# Patient Record
Sex: MALE | Race: WHITE | NOT HISPANIC OR LATINO | Employment: FULL TIME | URBAN - METROPOLITAN AREA
[De-identification: names, ages, dates, MRNs, and addresses within clinical notes are randomized per-mention and may not be internally consistent; named-entity substitution may affect disease eponyms.]

---

## 2019-11-07 ENCOUNTER — APPOINTMENT (OUTPATIENT)
Dept: PREADMISSION TESTING | Facility: HOSPITAL | Age: 59
End: 2019-11-07

## 2019-11-07 LAB
ANION GAP SERPL CALCULATED.3IONS-SCNC: 9 MMOL/L (ref 5–15)
BUN BLD-MCNC: 11 MG/DL (ref 8–23)
BUN/CREAT SERPL: 13.3 (ref 7–25)
CALCIUM SPEC-SCNC: 9.3 MG/DL (ref 8.6–10.5)
CHLORIDE SERPL-SCNC: 101 MMOL/L (ref 98–107)
CO2 SERPL-SCNC: 30 MMOL/L (ref 22–29)
CREAT BLD-MCNC: 0.83 MG/DL (ref 0.76–1.27)
DEPRECATED RDW RBC AUTO: 43.4 FL (ref 37–54)
ERYTHROCYTE [DISTWIDTH] IN BLOOD BY AUTOMATED COUNT: 12.5 % (ref 12.3–15.4)
GFR SERPL CREATININE-BSD FRML MDRD: 95 ML/MIN/1.73
GLUCOSE BLD-MCNC: 89 MG/DL (ref 65–99)
HCT VFR BLD AUTO: 40.9 % (ref 37.5–51)
HGB BLD-MCNC: 13.3 G/DL (ref 13–17.7)
MCH RBC QN AUTO: 30.5 PG (ref 26.6–33)
MCHC RBC AUTO-ENTMCNC: 32.5 G/DL (ref 31.5–35.7)
MCV RBC AUTO: 93.8 FL (ref 79–97)
PLATELET # BLD AUTO: 234 10*3/MM3 (ref 140–450)
PMV BLD AUTO: 9.1 FL (ref 6–12)
POTASSIUM BLD-SCNC: 4.3 MMOL/L (ref 3.5–5.2)
RBC # BLD AUTO: 4.36 10*6/MM3 (ref 4.14–5.8)
SODIUM BLD-SCNC: 140 MMOL/L (ref 136–145)
WBC NRBC COR # BLD: 5.75 10*3/MM3 (ref 3.4–10.8)

## 2019-11-07 PROCEDURE — 93010 ELECTROCARDIOGRAM REPORT: CPT | Performed by: INTERNAL MEDICINE

## 2019-11-07 PROCEDURE — 93005 ELECTROCARDIOGRAM TRACING: CPT

## 2019-11-07 PROCEDURE — 36415 COLL VENOUS BLD VENIPUNCTURE: CPT

## 2019-11-07 PROCEDURE — 80048 BASIC METABOLIC PNL TOTAL CA: CPT | Performed by: SURGERY

## 2019-11-07 PROCEDURE — 85027 COMPLETE CBC AUTOMATED: CPT | Performed by: SURGERY

## 2022-01-12 ENCOUNTER — HOSPITAL ENCOUNTER (OUTPATIENT)
Dept: GENERAL RADIOLOGY | Facility: HOSPITAL | Age: 62
Discharge: HOME | End: 2022-01-12
Admitting: INTERNAL MEDICINE

## 2022-01-12 ENCOUNTER — TRANSCRIBE ORDERS (OUTPATIENT)
Dept: ADMINISTRATIVE | Facility: HOSPITAL | Age: 62
End: 2022-01-12

## 2022-01-12 DIAGNOSIS — M54.9 BACK PAIN, UNSPECIFIED BACK LOCATION, UNSPECIFIED BACK PAIN LATERALITY, UNSPECIFIED CHRONICITY: ICD-10-CM

## 2022-01-12 DIAGNOSIS — M54.9 BACK PAIN, UNSPECIFIED BACK LOCATION, UNSPECIFIED BACK PAIN LATERALITY, UNSPECIFIED CHRONICITY: Primary | ICD-10-CM

## 2022-01-12 PROCEDURE — 72110 X-RAY EXAM L-2 SPINE 4/>VWS: CPT

## 2022-01-13 ENCOUNTER — HOSPITAL ENCOUNTER (OUTPATIENT)
Dept: MRI IMAGING | Facility: HOSPITAL | Age: 62
Discharge: HOME | End: 2022-01-13
Admitting: INTERNAL MEDICINE

## 2022-01-13 ENCOUNTER — TRANSCRIBE ORDERS (OUTPATIENT)
Dept: ADMINISTRATIVE | Facility: HOSPITAL | Age: 62
End: 2022-01-13

## 2022-01-13 DIAGNOSIS — M51.06 LUMBAR DISC HERNIATION WITH MYELOPATHY: ICD-10-CM

## 2022-01-13 DIAGNOSIS — M51.06 LUMBAR DISC HERNIATION WITH MYELOPATHY: Primary | ICD-10-CM

## 2022-01-13 PROCEDURE — 72148 MRI LUMBAR SPINE W/O DYE: CPT

## 2022-01-22 ENCOUNTER — OFFICE VISIT (OUTPATIENT)
Dept: NEUROSURGERY | Facility: CLINIC | Age: 62
End: 2022-01-22

## 2022-01-22 VITALS — WEIGHT: 294.6 LBS | BODY MASS INDEX: 41.24 KG/M2 | HEIGHT: 71 IN | TEMPERATURE: 97.8 F

## 2022-01-22 DIAGNOSIS — M51.16 LUMBAR DISC HERNIATION WITH RADICULOPATHY: Primary | ICD-10-CM

## 2022-01-22 DIAGNOSIS — M51.36 DDD (DEGENERATIVE DISC DISEASE), LUMBAR: ICD-10-CM

## 2022-01-22 PROCEDURE — 99204 OFFICE O/P NEW MOD 45 MIN: CPT | Performed by: NEUROLOGICAL SURGERY

## 2022-01-22 RX ORDER — ATORVASTATIN CALCIUM 10 MG/1
10 TABLET, FILM COATED ORAL DAILY
COMMUNITY
End: 2023-02-09

## 2022-01-22 RX ORDER — CYCLOBENZAPRINE HCL 10 MG
10 TABLET ORAL
COMMUNITY
Start: 2022-01-18 | End: 2022-02-25

## 2022-01-22 RX ORDER — MELOXICAM 15 MG/1
15 TABLET ORAL DAILY
COMMUNITY

## 2022-01-22 RX ORDER — PREDNISONE 10 MG/1
TABLET ORAL SEE ADMIN INSTRUCTIONS
COMMUNITY
Start: 2022-01-19 | End: 2022-02-25

## 2022-01-22 RX ORDER — VERAPAMIL HYDROCHLORIDE 40 MG/1
TABLET ORAL
COMMUNITY
End: 2022-05-03 | Stop reason: DRUGHIGH

## 2022-01-22 RX ORDER — GABAPENTIN 300 MG/1
CAPSULE ORAL
Qty: 90 CAPSULE | Refills: 0 | Status: SHIPPED | OUTPATIENT
Start: 2022-01-22 | End: 2022-02-22

## 2022-01-22 RX ORDER — ASPIRIN 81 MG/1
81 TABLET ORAL DAILY
COMMUNITY

## 2022-01-22 RX ORDER — HYDROCODONE BITARTRATE AND ACETAMINOPHEN 5; 325 MG/1; MG/1
TABLET ORAL SEE ADMIN INSTRUCTIONS
COMMUNITY
Start: 2022-01-19 | End: 2022-02-25

## 2022-01-22 NOTE — PROGRESS NOTES
Patient: Pelon Johnson Jr.  : 1960    Primary Care Provider: Marsha Mas MD    Requesting Provider: As above        History    Chief Complaint: Right lower extremity pain and weakness.    History of Present Illness: Mr. Johnson is a 61-year-old  who in his 30s had a bout of back difficulty.  It took about 6 months to improve but has done well since then.  On 1/10/2022 he was bending over and developed a severe cramping in his right thigh.  He has had ongoing pain in the thigh as well as some numbness and weakness.  He has fallen on 4 occasions.  He is using a crutch just to provide additional support.  He has no low back pain.  He has no left lower extremity symptoms.  He has been on steroid medication and Norco.  He is worse with protracted sitting.    Review of Systems   Constitutional: Negative for activity change, appetite change, chills, diaphoresis, fatigue, fever and unexpected weight change.   HENT: Negative for congestion, dental problem, drooling, ear discharge, ear pain, facial swelling, hearing loss, mouth sores, nosebleeds, postnasal drip, rhinorrhea, sinus pressure, sinus pain, sneezing, sore throat, tinnitus, trouble swallowing and voice change.    Eyes: Negative for photophobia, pain, discharge, redness, itching and visual disturbance.   Respiratory: Negative for apnea, cough, choking, chest tightness, shortness of breath, wheezing and stridor.    Cardiovascular: Positive for palpitations. Negative for chest pain and leg swelling.   Gastrointestinal: Negative for abdominal distention, abdominal pain, anal bleeding, blood in stool, constipation, diarrhea, nausea, rectal pain and vomiting.   Endocrine: Negative for cold intolerance, heat intolerance, polydipsia, polyphagia and polyuria.   Genitourinary: Negative for decreased urine volume, difficulty urinating, dysuria, enuresis, flank pain, frequency, genital sores, hematuria, penile discharge, penile pain, penile  "swelling, scrotal swelling, testicular pain and urgency.   Musculoskeletal: Positive for back pain. Negative for arthralgias, gait problem, joint swelling, myalgias, neck pain and neck stiffness.   Skin: Negative for color change, pallor, rash and wound.   Allergic/Immunologic: Negative for environmental allergies, food allergies and immunocompromised state.   Neurological: Positive for tremors, weakness and numbness. Negative for dizziness, seizures, syncope, facial asymmetry, speech difficulty, light-headedness and headaches.   Hematological: Negative for adenopathy. Does not bruise/bleed easily.   Psychiatric/Behavioral: Negative for agitation, behavioral problems, confusion, decreased concentration, dysphoric mood, hallucinations, self-injury, sleep disturbance and suicidal ideas. The patient is not nervous/anxious and is not hyperactive.        The patient's past medical history, past surgical history, family history, and social history have been reviewed at length in the electronic medical record.    Physical Exam:   Temp 97.8 °F (36.6 °C)   Ht 180.3 cm (71\")   Wt 134 kg (294 lb 9.6 oz)   BMI 41.09 kg/m²   CONSTITUTIONAL: Patient is well-nourished, pleasant and appears stated age.  MUSCULOSKELETAL:  Straight leg raising is negative.  Cristiano's Sign is negative.  ROM in the low back is mildly limited in all directions.  Tenderness in the back to palpation is not observed.  NEUROLOGICAL:  Orientation, memory, attention span, language function, and cognition have been examined and are intact.  Strength is intact in the lower extremities to direct testing except for right leg extension which is 4/5.  Muscle tone is normal throughout.  Station and gait are normal.  Sensation is intact to light touch testing throughout.  Deep tendon reflexes are 1+ and symmetrical.  Coordination is intact.      Medical Decision Making    Data Review:   (All imaging studies were personally reviewed unless stated otherwise)  MRI of " the lumbar spine dated 1/13/2022 demonstrates some diffuse degenerative disc disease.  There is a central disc protrusion with a rightward inferiorly extruded fragment from L2-3.  The fragment descends to almost the L3-4 disc space    Diagnosis:   Right L3 radiculopathy secondary to disc protrusion.    Treatment Options:   I have referred the patient to physical therapy and have prescribed gabapentin.  He will follow-up in my clinic in about 3.5 weeks.  If he is not improved or certainly if his weakness progresses then we will need to consider surgical intervention.  He will finish up the steroid medication.  He will continue to use the hydrocodone as needed.       Diagnosis Plan   1. Lumbar disc herniation with radiculopathy     2. DDD (degenerative disc disease), lumbar         Scribed for Ho Baptiste MD by Silvia Contreras, [facility] 1/22/2022 14:27 EST      I, Dr. Baptiste, personally performed the services described in the documentation, as scribed in my presence, and it is both accurate and complete.

## 2022-01-27 ENCOUNTER — TREATMENT (OUTPATIENT)
Dept: PHYSICAL THERAPY | Facility: CLINIC | Age: 62
End: 2022-01-27

## 2022-01-27 DIAGNOSIS — M51.16 LUMBAR DISC HERNIATION WITH RADICULOPATHY: Primary | ICD-10-CM

## 2022-01-27 PROCEDURE — 97162 PT EVAL MOD COMPLEX 30 MIN: CPT | Performed by: PHYSICAL THERAPIST

## 2022-01-27 PROCEDURE — 97110 THERAPEUTIC EXERCISES: CPT | Performed by: PHYSICAL THERAPIST

## 2022-01-27 NOTE — PATIENT INSTRUCTIONS
Access Code: [email or web address]  URL: [email or web address]  Date: 01/27/2022  Prepared by: Corinne Perkins    Exercises  Hooklying Gluteal Sets - 1 x daily - 7 x weekly - 2 sets - 10 reps  Supine Quad Set - 1 x daily - 7 x weekly - 2 sets - 10 reps  Supine Lower Trunk Rotation - 1 x daily - 7 x weekly - 1 sets - 10 reps  Seated March - 1 x daily - 7 x weekly - 1-2 sets - 10 reps
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
## 2022-01-31 ENCOUNTER — TREATMENT (OUTPATIENT)
Dept: PHYSICAL THERAPY | Facility: CLINIC | Age: 62
End: 2022-01-31

## 2022-01-31 DIAGNOSIS — M51.16 LUMBAR DISC HERNIATION WITH RADICULOPATHY: Primary | ICD-10-CM

## 2022-01-31 PROCEDURE — 97110 THERAPEUTIC EXERCISES: CPT | Performed by: PHYSICAL THERAPIST

## 2022-01-31 PROCEDURE — 97112 NEUROMUSCULAR REEDUCATION: CPT | Performed by: PHYSICAL THERAPIST

## 2022-01-31 NOTE — PROGRESS NOTES
Physical Therapy Daily Treatment Note      Patient: Pelon Johnson Jr.   : 1960  Referring practitioner: Ho Baptiste MD  Date of Initial Visit: Type: THERAPY  Noted: 2022  Today's Date: 2022  Patient seen for 2 sessions       Visit Diagnoses:    ICD-10-CM ICD-9-CM   1. Lumbar disc herniation with radiculopathy  M51.16 722.10     724.4       Subjective   Patient reports his right leg feels more weak today on arrival, states it has given out on him 3x but he hasn't fallen due to using crutch in LUE. States he did have right leg cramps last night, states he has been off steroids and Norco since Saturday night, so he is only currently taking Gabapentin. States his pain level is 0/10 upon arrival.      Objective   Tandem LLE EO 26 seconds, tandem RLE EO 18 seconds, 8 seconds (initial attempt)    See Exercise, Manual, and Modality Logs for complete treatment.       Assessment/Plan   Pt demonstrates trendelenburg with gait on level surface, using one axillary crutch in LUE. In supine position, noted mild LLD with LLE longer than RLE. Added small, heel lift in R shoe. Mild improvement noted in gait. He tolerated progression of standing functional quad exercises well, noted fatigue but not mechanical buckling of knee. Pt educated on progressed HEP, given written copy. He denies radicular symptoms during exercise in clinic today.  Progress next visit to include TG squats, side stepping with tB for HEP. Assess response from heel lift.       Timed:         Manual Therapy:    0     mins  28666;     Therapeutic Exercise:    29     mins  27118;     Neuromuscular Marisol:    10    mins  82051;    Therapeutic Activity:     0     mins  43994;     Gait Trainin     mins  80264;     Ultrasound:     0     mins  87227;    Ionto                               0    mins   57600  Self Care                       0     mins   00436  Canalith Repos    0     mins 42407      Un-Timed:  Electrical Stimulation:    0      mins  58827 ( );  Dry Needling     0     mins self-pay  Traction     0     mins 52602      Timed Treatment:   39   mins   Total Treatment:     39   mins    Corinne E. Perkins, PT  [place] License: [id number]

## 2022-01-31 NOTE — PATIENT INSTRUCTIONS
Access Code: [email or web address]  URL: [email or web address]  Date: 01/31/2022  Prepared by: Corinne Perkins    Exercises  Supine Lower Trunk Rotation - 1 x daily - 7 x weekly - 1 sets - 10 reps  Supine Bridge - 1 x daily - 7 x weekly - 1-2 sets - 10 reps  Small Range Straight Leg Raise - 1 x daily - 7 x weekly - 1-2 sets - 10 reps  Seated March - 1 x daily - 7 x weekly - 1-2 sets - 10 reps  Standing Hip Abduction with Counter Support - 1 x daily - 7 x weekly - 1 sets - 10 reps

## 2022-02-02 ENCOUNTER — TREATMENT (OUTPATIENT)
Dept: PHYSICAL THERAPY | Facility: CLINIC | Age: 62
End: 2022-02-02

## 2022-02-02 DIAGNOSIS — M51.16 LUMBAR DISC HERNIATION WITH RADICULOPATHY: Primary | ICD-10-CM

## 2022-02-02 PROCEDURE — 97110 THERAPEUTIC EXERCISES: CPT | Performed by: PHYSICAL THERAPIST

## 2022-02-02 PROCEDURE — 97112 NEUROMUSCULAR REEDUCATION: CPT | Performed by: PHYSICAL THERAPIST

## 2022-02-02 NOTE — PATIENT INSTRUCTIONS
Access Code: [email or web address]: [email or web address]: 02/02/2022Prepared by: Corinne PerkinsExercises   Supine Lower Trunk Rotation - 1 x daily - 7 x weekly - 1 sets - 10 reps   Supine Bridge - 1 x daily - 7 x weekly - 1-2 sets - 10 reps   Seated March - 1 x daily - 7 x weekly - 1-2 sets - 10 reps   Standing Hip Abduction with Counter Support - 1 x daily - 7 x weekly - 1 sets - 10 reps   Supine Knee Extension Strengthening - 1 x daily - 7 x weekly - 1-2 sets - 10 reps

## 2022-02-02 NOTE — PROGRESS NOTES
Physical Therapy Daily Treatment Note      Patient: Pelon Johnson Jr.   : 1960  Referring practitioner: Ho Baptiste MD  Date of Initial Visit: Type: THERAPY  Noted: 2022  Today's Date: 2022  Patient seen for 3 sessions       Visit Diagnoses:    ICD-10-CM ICD-9-CM   1. Lumbar disc herniation with radiculopathy  M51.16 722.10     724.4       Subjective   Patient reports he had a little soreness after last visit. States his pain level is 0/10 upon arrival.      Objective   See Exercise, Manual, and Modality Logs for complete treatment.       Assessment/Plan   Pt demonstrates significant functional eccentric quad weakness RLE with step downs from 1 stepper height. Pt is compliant with current HEP, modified HEP to include SAQ vs SLR for improved technique.       Timed:         Manual Therapy:    0     mins  55128;     Therapeutic Exercise:    29     mins  94526;     Neuromuscular Marisol:    10    mins  01647;    Therapeutic Activity:     0     mins  82803;     Gait Trainin     mins  36248;     Ultrasound:     0     mins  99103;    Ionto                               0    mins   33596  Self Care                       0     mins   63253  Canalith Repos    0     mins 10491      Un-Timed:  Electrical Stimulation:    0     mins  08866 ( );  Dry Needling     0     mins self-pay  Traction     0     mins 36662      Timed Treatment:   39   mins   Total Treatment:     39   mins    Corinne E. Perkins, PT  [place] License: [id number]

## 2022-02-07 ENCOUNTER — TREATMENT (OUTPATIENT)
Dept: PHYSICAL THERAPY | Facility: CLINIC | Age: 62
End: 2022-02-07

## 2022-02-07 DIAGNOSIS — M51.16 LUMBAR DISC HERNIATION WITH RADICULOPATHY: Primary | ICD-10-CM

## 2022-02-07 PROCEDURE — 97112 NEUROMUSCULAR REEDUCATION: CPT | Performed by: PHYSICAL THERAPIST

## 2022-02-07 PROCEDURE — 97110 THERAPEUTIC EXERCISES: CPT | Performed by: PHYSICAL THERAPIST

## 2022-02-07 NOTE — PROGRESS NOTES
Physical Therapy Daily Treatment Note      Patient: Pelon Johnson Jr.   : 1960  Referring practitioner: Ho Baptiste MD  Date of Initial Visit: Type: THERAPY  Noted: 2022  Today's Date: 2022  Patient seen for 4 sessions       Visit Diagnoses:    ICD-10-CM ICD-9-CM   1. Lumbar disc herniation with radiculopathy  M51.16 722.10     724.4       Subjective   Patient reports he has been walking more without his crutch.  States his pain level is 0/10 upon arrival.  States one morning he took a 5 mg steroid due to some pain, states he woke up with pain. No crutch with walking at home. States he has had some impaired balance moments, but denies any falls or close calls with right knee giving way.     Objective   Significant functional weakness of right hip flexors; fatigues quickly with standing exercise for hip flexion, SLS    See Exercise, Manual, and Modality Logs for complete treatment.       Assessment/Plan   Pt able to ambulate safely on level surface without AD today; noted increased right hip ER and right foot toeing out. He demonstrates functional weakness of right hip flexors along with right quad, fatigues quickly but tolerated increased reps of quad specific exercises today. Continue progression of right leg strengthening, emphasis on hip flexion, quads, hip abduction, and extension. Progress bridge HEP next visit along with SLS activity as tolerated.       Timed:         Manual Therapy:    0     mins  32683;     Therapeutic Exercise:    25     mins  60181;     Neuromuscular Marisol:    10    mins  33684;    Therapeutic Activity:     6     mins  18350;     Gait Trainin     mins  50423;     Ultrasound:     0     mins  02449;    Ionto                               0    mins   86097  Self Care                       0     mins   99083  Canalith Repos    0     mins 07827      Un-Timed:  Electrical Stimulation:    0     mins  38097 ( );  Dry Needling     0     mins  self-pay  Traction     0     mins 86177      Timed Treatment:   41   mins   Total Treatment:     45   mins    Corinne E. Perkins, PT  [place] License: [id number]

## 2022-02-09 ENCOUNTER — TREATMENT (OUTPATIENT)
Dept: PHYSICAL THERAPY | Facility: CLINIC | Age: 62
End: 2022-02-09

## 2022-02-09 DIAGNOSIS — M51.16 LUMBAR DISC HERNIATION WITH RADICULOPATHY: Primary | ICD-10-CM

## 2022-02-09 PROCEDURE — 97112 NEUROMUSCULAR REEDUCATION: CPT | Performed by: PHYSICAL THERAPIST

## 2022-02-09 PROCEDURE — 97110 THERAPEUTIC EXERCISES: CPT | Performed by: PHYSICAL THERAPIST

## 2022-02-09 NOTE — PATIENT INSTRUCTIONS
Access Code: [email or web address]  URL: [email or web address]  Date: 02/09/2022  Prepared by: Corinne Perkins    Exercises  Supine Diaphragmatic Breathing - 1 x daily - 7 x weekly - 3 sets - 10 reps

## 2022-02-09 NOTE — PROGRESS NOTES
Physical Therapy Daily Treatment Note      Patient: Pelon Johnson Jr.   : 1960  Referring practitioner: Ho Baptiste MD  Date of Initial Visit: Type: THERAPY  Noted: 2022  Today's Date: 2022  Patient seen for 5 sessions       Visit Diagnoses:    ICD-10-CM ICD-9-CM   1. Lumbar disc herniation with radiculopathy  M51.16 722.10     724.4       Subjective   States his pain level is 0/10 upon arrival.  States mild pain, 2/10 right lateral hip, intermittently throughout today. States he has noticed mild increase in left knee pain today with Nustep and attempted step ups.     Objective   See Exercise, Manual, and Modality Logs for complete treatment.       Assessment/Plan   Pt is making steady progress with improved right quad strength, tolerated SLR with a mild right quad ext lag noted after 4 reps. Educated patient on diaphragmatic breathing to improve trA activation and spinal stabilization. Pt able to incorporate alternating BLE march from hook lying position. Limited standing therex secondary to reports of increased left medial knee pain and intermittent right lateral hip pain today. He is ambulating well without AD, step through gait pattern, equal step lengths.       Timed:         Manual Therapy:    0     mins  35033;     Therapeutic Exercise:    29     mins  31845;     Neuromuscular Marisol:    10    mins  21638;    Therapeutic Activity:     0     mins  09623;     Gait Trainin     mins  98441;     Ultrasound:     0     mins  37478;    Ionto                               0    mins   62159  Self Care                       0     mins   13266  Canalith Repos    0     mins 82395      Un-Timed:  Electrical Stimulation:    0     mins  09719 ( );  Dry Needling     0     mins self-pay  Traction     0     mins 94356      Timed Treatment:   39   mins   Total Treatment:     39   mins    Corinne E. Perkins, PT  [place] License: [id number]

## 2022-02-21 ENCOUNTER — TREATMENT (OUTPATIENT)
Dept: PHYSICAL THERAPY | Facility: CLINIC | Age: 62
End: 2022-02-21

## 2022-02-21 DIAGNOSIS — M51.16 LUMBAR DISC HERNIATION WITH RADICULOPATHY: Primary | ICD-10-CM

## 2022-02-21 PROCEDURE — 97110 THERAPEUTIC EXERCISES: CPT | Performed by: PHYSICAL THERAPIST

## 2022-02-21 PROCEDURE — 97112 NEUROMUSCULAR REEDUCATION: CPT | Performed by: PHYSICAL THERAPIST

## 2022-02-21 NOTE — PROGRESS NOTES
Physical Therapy Daily Treatment Note      Patient: Pelon Johnson Jr.   : 1960  Referring practitioner: Ho Baptiste MD  Date of Initial Visit: Type: THERAPY  Noted: 2022  Today's Date: 2022  Patient seen for 6 sessions       Visit Diagnoses:    ICD-10-CM ICD-9-CM   1. Lumbar disc herniation with radiculopathy  M51.16 722.10     724.4       Subjective   Patient reports increased left knee pain recently. States  his pain level is 1-2/10 upon arrival Patient reports his back has been doing well, less symptoms in RLE overall; however did have recent episode of R knee giving out when attempted to climb stair at [employer] [facility] with RLE first.     Objective   See Exercise, Manual, and Modality Logs for complete treatment.       Assessment/Plan   Pt demonstrates improved RLE quad activation, able to perform 5-6 reps of SAQ into SLR prior to extension lag and fatigue. He is making steady progress with strengthening exercises. Pt is scheduled to see MD this Friday. Reassessment next visit.       Timed:         Manual Therapy:    0     mins  10408;     Therapeutic Exercise:    28     mins  09253;     Neuromuscular Marisol:    10    mins  40382;    Therapeutic Activity:     0     mins  47860;     Gait Trainin     mins  99375;     Ultrasound:     0     mins  57674;    Ionto                               0    mins   65204  Self Care                       0     mins   23976  Canalith Repos    0     mins 13083      Un-Timed:  Electrical Stimulation:    0     mins  50019 ( );  Dry Needling     0     mins self-pay  Traction     0     mins 91892      Timed Treatment:   38   mins   Total Treatment:     43   mins    Corinne E. Perkins, PT  [place] License: [id number]

## 2022-02-22 DIAGNOSIS — M51.16 LUMBAR DISC HERNIATION WITH RADICULOPATHY: ICD-10-CM

## 2022-02-22 RX ORDER — GABAPENTIN 300 MG/1
CAPSULE ORAL
Qty: 90 CAPSULE | Refills: 1 | Status: SHIPPED | OUTPATIENT
Start: 2022-02-22 | End: 2022-05-16

## 2022-02-22 NOTE — TELEPHONE ENCOUNTER
Provider:  Emile  Caller: patient  Time of call:     Phone #: [phone]   Surgery:    Surgery Date:    Last visit:   01/22/22  Next visit: 02/25/22    TONY:    01/12/2022 Hydrocodone Bitartrate/Ac 325MG/5MG 1960 18 3 Marsha Mas [employer] [place] [place] 30 1  01/19/2022 Hydrocodone Bitartrate/Ac 325MG/5MG 1960 18 3 [facility] [place] [facility]  01/22/2022 Gabapentin 300MG 1960 90 33 Ho Baptiste [place] [facility] [place] [facility]     Reason for call:    Patient requests refill on Gabapentin.  He has tapered up to 1 po tid.

## 2022-02-22 NOTE — PROGRESS NOTES
Physical Therapy Daily Treatment Note  Opened on error; no charge.     Corinne E. Perkins, PT  [place] License: [id number]

## 2022-02-23 ENCOUNTER — TREATMENT (OUTPATIENT)
Dept: PHYSICAL THERAPY | Facility: CLINIC | Age: 62
End: 2022-02-23

## 2022-02-23 DIAGNOSIS — M51.16 LUMBAR DISC HERNIATION WITH RADICULOPATHY: Primary | ICD-10-CM

## 2022-02-23 PROCEDURE — 97530 THERAPEUTIC ACTIVITIES: CPT | Performed by: PHYSICAL THERAPIST

## 2022-02-23 PROCEDURE — 97110 THERAPEUTIC EXERCISES: CPT | Performed by: PHYSICAL THERAPIST

## 2022-02-23 NOTE — PROGRESS NOTES
Physical Therapy Re Certification Of Plan of Care  Patient: Pelon Johnson Jr.   : 1960  Diagnosis/ICD-10 Code:  Lumbar disc herniation with radiculopathy [M51.16]  Referring practitioner: Ho Baptiste MD  Date of Initial Visit: 2022  Today's Date: 2022  Patient seen for 7 sessions         Visit Diagnoses:    ICD-10-CM ICD-9-CM   1. Lumbar disc herniation with radiculopathy  M51.16 722.10     724.4           Subjective Questionnaire: Oswestry: 24%  Clinical Progress: improved  Home Program Compliance: Yes  Treatment has included: therapeutic exercise, neuromuscular re-education, manual therapy and therapeutic activity      Subjective   Pelon Johnson reports: he was fatigued and sore from last visit. States he was surprised because he felt good after the visit. He denies radicular symptoms in RLE, reports he hasn't noticed numbness in right thigh for a little over a week. Reports he does get intermittent cramping in right leg while sleeping sometimes. He has f/u with Dr. Baptiste this week.     Objective        Special Questions  Patient is experiencing disturbed sleep.     Additional Special Questions  Pt denies pain with bowel/bladder; reports sleep has improved 90-95% since initial evaluation. A few mornings waking up due to mild pain, took medication with resolution.       Postural Observations  Seated posture: fair        Palpation     Additional Palpation Details  Pt denies TTP along lumbar spine.     Neurological Testing     Sensation     Lumbar   Left   Intact: light touch and hot/cold discrimination    Right   Intact: light touch and hot/cold discrimination    Reflexes   Left   Patellar (L4): trace (1+)  Achilles (S1): trace (1+)    Right   Patellar (L4): trace (1+)  Achilles (S1): trace (1+)    Additional Neurological Details  Reports mild hypersensitivity to right quad when he leaned forward into a counter/table.     Active Range of Motion     Lumbar   Flexion: 69 degrees    Extension: 29 degrees   Left lateral flexion: 29 degrees   Right lateral flexion: 26 degrees     Strength/Myotome Testing     Left Hip   Planes of Motion   Flexion: 5  Extension: 4+  Abduction: 5  Adduction: 5    Right Hip   Planes of Motion   Flexion: 4  Extension: 4  Abduction: 4+  Adduction: 5    Left Knee   Flexion: 5  Extension: 5    Right Knee   Flexion: 5  Extension: 4    Left Ankle/Foot   Dorsiflexion: 5  Plantar flexion: 5    Right Ankle/Foot   Dorsiflexion: 4+  Plantar flexion: 5    Tests     Lumbar   Positive repeated flexion.   Negative repeated extension.     Right   Negative crossed SLR, passive lumbar instability, passive SLR and valsalva.     Right Pelvic Girdle/Sacrum   Negative: sacral spring.     Additional Tests Details   Prone prop up WFL, no radicular symptoms.   (-) CHANCE bilateral  Mild taut right hamstring  Taut right quads with prone quad stretch    Able to perform coordination tests: toe taps WFL, heel to shin L WFL, decreased on RLE but able to perform    Ambulation     Ambulation: Stairs   Ascend stairs: independent  Pattern: non-reciprocal  Railings: two rails  Descend stairs: independent  Pattern: non-reciprocal  Railings: two rails    Additional Stairs Ambulation Details  Attempted reciprocal gait up stairs for 5 steps, WFL with 2 handrails    With descending stairs, noted 1 buckling episode of right knee when attempted reciprocal gait.   Encouraged step through ascending with BUE support, and step to gait descending, leading with RLE.     Comments   Pt ambulated with step through gait pattern, no AD, equal step lengths.               Assessment & Plan     Assessment  Impairments: abnormal gait, abnormal or restricted ROM, activity intolerance, impaired balance, impaired physical strength and pain with function  Functional Limitations: carrying objects, lifting, sleeping, walking, uncomfortable because of pain and sitting  Assessment details: Reassessment completed today in clinic  for 62 yo male referred to PT for lumbar radiculopathy affecting RLE who demonstrates signs and symptoms consistent with diagnosis. His recent lumbar MRI revealed a central disc protrusion with a rightward inferiorly extruded fragment from L2-L3. He is progressing towards his functional goals as expected, centralized RLE symptoms, demonstrates improved gait mechanics on level ground without AD and denies LBP. Improved R quad activation noted with SLR, fatigues with about 5 reps with extension lag. He denies any falls in past 4 weeks; however has had a few episodes of right knee buckling with stairs. Recommend continued skilled PT to further improve RLE strength, spinal stabilization, and increase activity tolerance with walking/functional mobility.   Prognosis: good    Goals  Plan Goals: Short Term Goals (2 weeks)  1. Patient is independent with HEP for flexibility and strengthening. MET  2.  Patient to report pain less than or equal to 2/10. MET  3.  Patient to report decreased pain with bending forward to ADLs or prolonged sitting at work for 1-2 hours. MET  4. Patient subjectively report that altered leg symptoms have decreased by 50% with frequency or intensity. MET    Long Term Goals (4 weeks)  1. Patient will demonstrate lumbar AROM WNL in all planes to improve ability to perform daily functional activities. ONGOING  2. Patient is independent with long term HEP for improved postural awareness and stabilization. ONGOING  3. Modified Oswestry score is improved by at least 10%. MET  4.Patient will demonstrate proper lifting mechanics, utilizing abdominal stabilization. ONGOING  5. Pt will ambulate 450 ft without AD independently w/o right knee buckling. MET      Plan  Therapy options: will be seen for skilled therapy services  Planned modality interventions: cryotherapy, dry needling, electrical stimulation/Russian stimulation, traction and thermotherapy (hydrocollator packs)  Planned therapy interventions:  abdominal trunk stabilization, balance/weight-bearing training, body mechanics training, flexibility, functional ROM exercises, gait training, home exercise program, joint mobilization, manual therapy, neuromuscular re-education, postural training, soft tissue mobilization, spinal/joint mobilization, strengthening, stretching and therapeutic activities  Frequency: 2x week  Duration in visits: 8  Duration in weeks: 4  Treatment plan discussed with: patient  Plan details: F/u with patient after MD appt. Progress spinal stabilization exercises and HEP as tolerated. Resume walking treadmill, discussed option for home.            Recommendations: Continue as planned  Timeframe: 1 month  Prognosis to achieve goals: good      Timed:         Manual Therapy:    0     mins  61164;     Therapeutic Exercise:    29     mins  05898;     Neuromuscular Marisol:    0    mins  48597;    Therapeutic Activity:     10     mins  51172;     Gait Trainin     mins  74668;     Ultrasound:     0     mins  30578;    Ionto                               0    mins   52869  Self Care                       0     mins   41406  Canalith Repos    0     mins 68096      Un-Timed:  Electrical Stimulation:    0     mins  32337 (MC );  Dry Needling     0     mins self-pay  Traction     0     mins 83158  Re-Eval                           0    mins  15769      Timed Treatment:   39   mins   Total Treatment:     43   mins          PT: Corinne E. Perkins, PT     [place] License:  [id number]    Electronically signed by Corinne E. Perkins, PT, 22, 3:09 PM EST    Certification Period: 2022 thru 2022  I certify that the therapy services are furnished while this patient is under my care.  The services outlined above are required by this patient, and will be reviewed every 90 days.         Physician Signature:__________________________________________________    PHYSICIAN: Ho Baptiste MD   NPI: [id number]     DATE:     Please sign and return  via fax to [email or web address] . Thank you, [facility]

## 2022-02-25 ENCOUNTER — OFFICE VISIT (OUTPATIENT)
Dept: NEUROSURGERY | Facility: CLINIC | Age: 62
End: 2022-02-25

## 2022-02-25 VITALS — TEMPERATURE: 97.1 F | HEIGHT: 71 IN | WEIGHT: 304 LBS | BODY MASS INDEX: 42.56 KG/M2

## 2022-02-25 DIAGNOSIS — M51.36 DDD (DEGENERATIVE DISC DISEASE), LUMBAR: ICD-10-CM

## 2022-02-25 DIAGNOSIS — M51.16 LUMBAR DISC HERNIATION WITH RADICULOPATHY: Primary | ICD-10-CM

## 2022-02-25 PROCEDURE — 99213 OFFICE O/P EST LOW 20 MIN: CPT | Performed by: NEUROLOGICAL SURGERY

## 2022-02-25 NOTE — PROGRESS NOTES
Patient: Pelon Johnson Jr.  : 1960    Primary Care Provider: Marsha Mas MD    Requesting Provider: As above        History    Chief Complaint: Right lower extremity pain and weakness.    History of Present Illness: Mr. Johnson is a 61-year-old  who in his 30s had a bout of back difficulty.  It took about 6 months to improve but has done well since then.  On 1/10/2022 he was bending over and developed a severe cramping in his right thigh.  He had experienced right thigh pain as well as numbness and weakness.  He had fallen and was using a crutch.  Studies demonstrated L2-3 disc herniation on the right that descended to nearly the L3-4 disc space.  He has done therapy and was placed on gabapentin.  He is much better.  He is off his crutch.  His pain remains but at a much lower intensity.  The gabapentin is well-tolerated and is quite helpful.  He still has a little bit of trouble climbing stairs and walking up inclines.    Review of Systems   Constitutional: Negative for activity change, appetite change, chills, diaphoresis, fatigue, fever and unexpected weight change.   HENT: Negative for congestion, dental problem, drooling, ear discharge, ear pain, facial swelling, hearing loss, mouth sores, nosebleeds, postnasal drip, rhinorrhea, sinus pressure, sneezing, sore throat, tinnitus, trouble swallowing and voice change.    Eyes: Negative for photophobia, pain, discharge, redness, itching and visual disturbance.   Respiratory: Negative for apnea, cough, choking, chest tightness, shortness of breath, wheezing and stridor.    Cardiovascular: Negative for chest pain, palpitations and leg swelling.   Gastrointestinal: Negative for abdominal distention, abdominal pain, anal bleeding, blood in stool, constipation, diarrhea, nausea, rectal pain and vomiting.   Endocrine: Negative for cold intolerance, heat intolerance, polydipsia, polyphagia and polyuria.   Genitourinary: Negative for decreased  "urine volume, difficulty urinating, dysuria, enuresis, flank pain, frequency, genital sores, hematuria and urgency.   Musculoskeletal: Positive for arthralgias, back pain and gait problem. Negative for joint swelling, myalgias, neck pain and neck stiffness.   Skin: Negative for color change, pallor, rash and wound.   Allergic/Immunologic: Negative for environmental allergies, food allergies and immunocompromised state.   Neurological: Positive for weakness. Negative for dizziness, tremors, seizures, syncope, facial asymmetry, speech difficulty, light-headedness, numbness and headaches.   Hematological: Negative for adenopathy. Does not bruise/bleed easily.   Psychiatric/Behavioral: Negative for agitation, behavioral problems, confusion, decreased concentration, dysphoric mood, hallucinations, self-injury, sleep disturbance and suicidal ideas. The patient is not nervous/anxious and is not hyperactive.    All other systems reviewed and are negative.      The patient's past medical history, past surgical history, family history, and social history have been reviewed at length in the electronic medical record.    Physical Exam:   Temp 97.1 °F (36.2 °C) (Infrared)   Ht 180.3 cm (71\")   Wt (!) 138 kg (304 lb)   BMI 42.40 kg/m²   Patient ambulates independently.  Right leg extension is 4/5.    Medical Decision Making    Data Review:   (All imaging studies were personally reviewed unless stated otherwise)  MRI of the lumbar spine dated 1/13/2022 demonstrates some diffuse degenerative disc disease.  There is a central disc protrusion with a rightward inferiorly extruded fragment from L2-3.  The fragment descends to almost the L3-4 disc space.    Diagnosis:   Right L3 radiculopathy secondary to disc protrusion.    Treatment Options:   The patient is doing much better.  He will continue to be active.  He will continue his gabapentin.  He will follow-up in our clinic in 2.5-3 months.  At that time if he is doing well then we " will slowly wean the gabapentin.  If his symptoms worsen in the interim then he will contact me.       Diagnosis Plan   1. Lumbar disc herniation with radiculopathy     2. DDD (degenerative disc disease), lumbar         Scribed for Ho Baptiste MD by Silvia Contreras, [facility] 2/25/2022 16:16 EST      I, Dr. Baptiste, personally performed the services described in the documentation, as scribed in my presence, and it is both accurate and complete.

## 2022-03-02 ENCOUNTER — TREATMENT (OUTPATIENT)
Dept: PHYSICAL THERAPY | Facility: CLINIC | Age: 62
End: 2022-03-02

## 2022-03-02 DIAGNOSIS — M51.16 LUMBAR DISC HERNIATION WITH RADICULOPATHY: Primary | ICD-10-CM

## 2022-03-02 PROCEDURE — 97530 THERAPEUTIC ACTIVITIES: CPT | Performed by: PHYSICAL THERAPIST

## 2022-03-02 PROCEDURE — 97110 THERAPEUTIC EXERCISES: CPT | Performed by: PHYSICAL THERAPIST

## 2022-03-02 NOTE — PROGRESS NOTES
Physical Therapy Daily Treatment Note      Patient: Pelon Johnson Jr.   : 1960  Referring practitioner: Ho Baptiste MD  Date of Initial Visit: Type: THERAPY  Noted: 2022  Today's Date: 3/2/2022  Patient seen for 8 sessions       Visit Diagnoses:    ICD-10-CM ICD-9-CM   1. Lumbar disc herniation with radiculopathy  M51.16 722.10     724.4       Subjective   Patient reports he didn't sleep well last night, reports mild pain in right lateral hip, rated 1/10 today on arrival. States prior to today, back and leg symptoms have improved. Pt states he doesn't feel pain with driving, standing or prolonged sitting. States his f/u with Dr. Baptiste went well, continuing the Gabapentin for now and anticipates weaning off as able in the future.     Objective   See Exercise, Manual, and Modality Logs for complete treatment.       Assessment/Plan   Held progression of spinal stabilization exercises in clinic today secondary to reports of increased right hip pain, unsure if related to primary LBP. No significant change in hip pain noted with prone, sitting, or standing positions. Denies TTP along GT, ITB, and lateral hip with palpation. Reviewed current HEP and modifications for home as needed. Encouraged patient to begin walking program, short distances as tolerated.       Timed:         Manual Therapy:    0     mins  16702;     Therapeutic Exercise:    23     mins  28792;     Neuromuscular Marisol:    0    mins  81319;    Therapeutic Activity:     8     mins  25735;     Gait Trainin     mins  99891;     Ultrasound:     0     mins  02901;    Ionto                               0    mins   77537  Self Care                       0     mins   84059  Canalith Repos    0     mins 52991      Un-Timed:  Electrical Stimulation:    0     mins  36916 ( );  Dry Needling     0     mins self-pay  Traction     0     mins 25019      Timed Treatment:   31   mins   Total Treatment:     31   mins    Corinne E.  Rosemary, PT  [place] License: [id number]

## 2022-03-09 ENCOUNTER — TREATMENT (OUTPATIENT)
Dept: PHYSICAL THERAPY | Facility: CLINIC | Age: 62
End: 2022-03-09

## 2022-03-09 DIAGNOSIS — M51.16 LUMBAR DISC HERNIATION WITH RADICULOPATHY: Primary | ICD-10-CM

## 2022-03-09 PROCEDURE — 97110 THERAPEUTIC EXERCISES: CPT | Performed by: PHYSICAL THERAPIST

## 2022-03-09 PROCEDURE — 97112 NEUROMUSCULAR REEDUCATION: CPT | Performed by: PHYSICAL THERAPIST

## 2022-03-09 NOTE — PROGRESS NOTES
Physical Therapy Daily Treatment Note      Patient: Pelon Johnson Jr.   : 1960  Referring practitioner: Ho Baptiste MD  Date of Initial Visit: Type: THERAPY  Noted: 2022  Today's Date: 3/9/2022  Patient seen for 9 sessions       Visit Diagnoses:    ICD-10-CM ICD-9-CM   1. Lumbar disc herniation with radiculopathy  M51.16 722.10     724.4       Subjective   Patient reports he is still on gabapentin, states his early morning pain is getting more rare. States he is using arthritis cream about every other day on his left knee. States  his pain level is 0/10 upon arrival.      Objective   See Exercise, Manual, and Modality Logs for complete treatment.       Assessment/Plan   Patient demonstrates trendelenburg gait pattern with slower and faster gait speeds, mild decreased arm swing on R. Attempted right hip flexor stretching RLE and LLE hamstring stretch for improved gait mechanics as well. He tolerated progression of eccentric quad strengthening exercises well, fatigued but denies pain. Assess response from exercise, progress as tolerated for improved hip/pelvic stabilization and knee strength.       Timed:         Manual Therapy:    0     mins  35925;     Therapeutic Exercise:    23     mins  71492;     Neuromuscular Marisol:    15    mins  07657;    Therapeutic Activity:     0     mins  99697;     Gait Trainin     mins  19275;     Ultrasound:     0     mins  20771;    Ionto                               0    mins   12332  Self Care                       0     mins   40005  Canalith Repos    0     mins 85631      Un-Timed:  Electrical Stimulation:    0     mins  99677 (MC );  Dry Needling     0     mins self-pay  Traction     0     mins 91024      Timed Treatment:   38   mins   Total Treatment:     40   mins    Corinne E. Perkins, PT  [place] License: [id number]

## 2022-03-16 ENCOUNTER — TREATMENT (OUTPATIENT)
Dept: PHYSICAL THERAPY | Facility: CLINIC | Age: 62
End: 2022-03-16

## 2022-03-16 DIAGNOSIS — M51.16 LUMBAR DISC HERNIATION WITH RADICULOPATHY: Primary | ICD-10-CM

## 2022-03-16 PROCEDURE — 97112 NEUROMUSCULAR REEDUCATION: CPT | Performed by: PHYSICAL THERAPIST

## 2022-03-16 PROCEDURE — 97110 THERAPEUTIC EXERCISES: CPT | Performed by: PHYSICAL THERAPIST

## 2022-03-16 NOTE — PROGRESS NOTES
Physical Therapy Daily Treatment Note      Patient: Pelon Johnson Jr.   : 1960  Referring practitioner: Ho Baptiste MD  Date of Initial Visit: Type: THERAPY  Noted: 2022  Today's Date: 3/16/2022  Patient seen for 10 sessions       Visit Diagnoses:    ICD-10-CM ICD-9-CM   1. Lumbar disc herniation with radiculopathy  M51.16 722.10     724.4       Subjective   Patient reports his back has been doing well; however states he had his first instance of right knee giving out this morning, first time its happened in a few weeks. States he was able to regain balance with UEs, no fall. States  his pain level is 0/10 upon arrival. States he is putting arthritis cream on left knee daily due to noted increased left knee pain.     Objective   See Exercise, Manual, and Modality Logs for complete treatment.       Assessment/Plan   Held progression of standing NMRE and hip strengthening in standing position today secondary to reports of increased left knee pain. He is weight shifting towards R in lateral direction and forward direction well, improved functional quad activation noted. Could consider ortho referral for left knee pain, possible injection, if joint pain persists and limits overall activity/participation in therapy. He would benefit from continued hip strengthening exercises, knee exercises, and NMRE for improved balance and decrease fall risks.       5  Timed:         Manual Therapy:    4     mins  12329;     Therapeutic Exercise:    23     mins  52617;     Neuromuscular Marisol:    10    mins  60709;    Therapeutic Activity:     0     mins  38762;     Gait Trainin     mins  66878;     Ultrasound:     0     mins  22113;    Ionto                               0    mins   58499  Self Care                       0     mins   09085  Canalith Repos    0     mins 62765      Un-Timed:  Electrical Stimulation:    0     mins  89179 ( );  Dry Needling     0     mins self-pay  Traction     0      mins 91978      Timed Treatment:   37   mins   Total Treatment:     40   mins    Corinne E. Perkins, PT  [place] License: [id number]

## 2022-03-23 ENCOUNTER — TREATMENT (OUTPATIENT)
Dept: PHYSICAL THERAPY | Facility: CLINIC | Age: 62
End: 2022-03-23

## 2022-03-23 DIAGNOSIS — M51.16 LUMBAR DISC HERNIATION WITH RADICULOPATHY: Primary | ICD-10-CM

## 2022-03-23 PROCEDURE — 97112 NEUROMUSCULAR REEDUCATION: CPT | Performed by: PHYSICAL THERAPIST

## 2022-03-23 PROCEDURE — 97110 THERAPEUTIC EXERCISES: CPT | Performed by: PHYSICAL THERAPIST

## 2022-03-23 NOTE — PROGRESS NOTES
Physical Therapy Daily Treatment Note      Patient: Pelon Johnson Jr.   : 1960  Referring practitioner: Ho Baptiste MD  Date of Initial Visit: Type: THERAPY  Noted: 2022  Today's Date: 3/24/2022  Patient seen for 11 sessions       Visit Diagnoses:    ICD-10-CM ICD-9-CM   1. Lumbar disc herniation with radiculopathy  M51.16 722.10     724.4       Subjective   Patient reports he was able to walk on treadmill this week, about 10 min at 2.0mph. States he felt good. States his pain level is 0/10 upon arrival. Pt states he had fall when attempting to step down off a curb, leading with RLE. States he fell to the ground.     Objective   See Exercise, Manual, and Modality Logs for complete treatment.       Assessment/Plan  Patient limited with standing hip exercise progression secondary to increased left knee pain. He verbalized compliance with current HEP, states his goal is to return to walking on treadmill for improved health/wellness. Able to walk on treadmill for 5 minutes on 2.0mph prior to noted increase L knee pain. Encouraged patient to consider ortho referral for left knee pain. Reassessment next visit.     Timed:         Manual Therapy:    0     mins  96373;     Therapeutic Exercise:    30     mins  11635;     Neuromuscular Marisol:    8    mins  91734;    Therapeutic Activity:     0     mins  02407;     Gait Trainin     mins  70408;     Ultrasound:     0     mins  40777;    Ionto                               0    mins   89217  Self Care                       0     mins   08148  Canalith Repos    0     mins 94820      Un-Timed:  Electrical Stimulation:    0     mins  15395 ( );  Dry Needling     0     mins self-pay  Traction     0     mins 42777      Timed Treatment:   38   mins   Total Treatment:     38   mins    Corinne E. Perkins, PT  [place] License: [id number]

## 2022-03-30 ENCOUNTER — TREATMENT (OUTPATIENT)
Dept: PHYSICAL THERAPY | Facility: CLINIC | Age: 62
End: 2022-03-30

## 2022-03-30 DIAGNOSIS — M51.16 LUMBAR DISC HERNIATION WITH RADICULOPATHY: Primary | ICD-10-CM

## 2022-03-30 PROCEDURE — 97530 THERAPEUTIC ACTIVITIES: CPT | Performed by: PHYSICAL THERAPIST

## 2022-03-30 PROCEDURE — 97110 THERAPEUTIC EXERCISES: CPT | Performed by: PHYSICAL THERAPIST

## 2022-03-30 NOTE — PATIENT INSTRUCTIONS
Access Code: [email or web address]  URL: [email or web address]  Date: 03/30/2022  Prepared by: Corinne Perkins    Exercises  Small Range Straight Leg Raise - 1 x daily - 4 x weekly - 1-2 sets - 10 reps  Gastroc Stretch on Wall - 1 x daily - 7 x weekly - 1 sets - 2 reps - 20-30 seconds hold  Standing Romberg to 3/4 Tandem Stance - 1 x daily - 4 x weekly - 1 sets - 2-3 reps - 30 seconds hold  Tandem Stance - 1 x daily - 4 x weekly - 1 sets - 2-3 reps - 20-30 seconds hold

## 2022-03-30 NOTE — PROGRESS NOTES
Physical Therapy Re Certification Of Plan of Care  Patient: Pelon Johnson Jr.   : 1960  Diagnosis/ICD-10 Code:  Lumbar disc herniation with radiculopathy [M51.16]  Referring practitioner: Ho Baptiste MD  Date of Initial Visit: 3/30/2022  Today's Date: 3/31/2022  Patient seen for 12 sessions         Visit Diagnoses:    ICD-10-CM ICD-9-CM   1. Lumbar disc herniation with radiculopathy  M51.16 722.10     724.4           Subjective Questionnaire: Oswestry: 14%  Clinical Progress: improved  Home Program Compliance: Yes  Treatment has included: therapeutic exercise, neuromuscular re-education, manual therapy and therapeutic activity      Subjective   Pelon Johnson reports: he is better overall. States he was able to climb 2 flights of stairs reciprocally without issues. States he is going to see ortho PA next week, for left knee pain. States he hasn't had any instability episodes the past week.     Objective        Special Questions      Additional Special Questions  Pt denies pain with bowel/bladder; reports sleep has improved 90-95% since initial evaluation. A few mornings waking up due to mild pain, took medication (Gabapentin) with resolution.       Postural Observations  Seated posture: fair        Palpation     Additional Palpation Details  Pt denies TTP along lumbar spine.     Neurological Testing     Sensation     Lumbar   Left   Intact: light touch and hot/cold discrimination    Right   Intact: light touch and hot/cold discrimination    Reflexes   Left   Patellar (L4): trace (1+)  Achilles (S1): trace (1+)    Right   Patellar (L4): trace (1+)  Achilles (S1): trace (1+)    Active Range of Motion     Lumbar   Flexion: 74 degrees   Extension: 34 degrees   Left lateral flexion: 29 degrees   Right lateral flexion: 26 degrees     Strength/Myotome Testing     Left Hip   Planes of Motion   Flexion: 5  Extension: 4+  Abduction: 5  Adduction: 5    Right Hip   Planes of Motion   Flexion: 5  Extension:  4+  Abduction: 4+  Adduction: 5    Left Knee   Flexion: 5  Extension: 5    Right Knee   Flexion: 5  Extension: 4+    Left Ankle/Foot   Dorsiflexion: 5  Plantar flexion: 5    Right Ankle/Foot   Dorsiflexion: 4+  Plantar flexion: 5    Tests     Lumbar   Positive repeated flexion.   Negative repeated extension.     Right   Negative crossed SLR, passive lumbar instability, passive SLR and valsalva.     Right Pelvic Girdle/Sacrum   Negative: sacral spring.     Additional Tests Details   Prone prop up WFL, no radicular symptoms.   (-) CHANCE bilateral  Able to perform coordination tests: toe taps WFL, heel to shin bilateral WFL  SLR (-) bilaterally    Ambulation     Ambulation: Stairs   Ascend stairs: independent  Pattern: reciprocal  Railings: one rail  Descend stairs: independent  Pattern: reciprocal  Railings: two rails    Comments   Pt ambulated with step through gait pattern, no AD, equal step lengths. Denies instability or left knee pain with ambulation on level surface today.               Assessment & Plan     Assessment  Impairments: activity intolerance, impaired balance and impaired physical strength  Functional Limitations: carrying objects, lifting, sleeping, walking, uncomfortable because of pain and sitting  Assessment details: Reassessment completed today in clinic for 60 yo male referred to PT for lumbar radiculopathy affecting RLE who demonstrates signs and symptoms consistent with diagnosis. His recent lumbar MRI revealed a central disc protrusion with a rightward inferiorly extruded fragment from L2-L3. He is progressing towards his functional goals as expected, resolved RLE symptoms, demonstrates improved gait mechanics on level ground without AD and denies LBP. He demonstrates improved lumbar AROM, progressing with improved RLE strength, able to now perform 10 SLR on RLE prior to fatigue. He denies any buckling or instability episodes in RLE for the past week; however he does report left knee pain has  been ongoing, limiting progression of standing functional strengthening exercises. He is scheduled to see ortho PA next week to discuss left knee POC, likely worsened with weakness of right hip from LBP episode. Recommend continued skilled PT to further improve RLE strength, spinal stabilization, and increase activity tolerance with walking/functional mobility.   Prognosis: good    Goals  Plan Goals: Short Term Goals (2 weeks)  1. Patient is independent with HEP for flexibility and strengthening. MET  2.  Patient to report pain less than or equal to 2/10. MET  3.  Patient to report decreased pain with bending forward to ADLs or prolonged sitting at work for 1-2 hours. MET  4. Patient subjectively report that altered leg symptoms have decreased by 50% with frequency or intensity. MET    Long Term Goals (4 weeks)  1. Patient will demonstrate lumbar AROM WNL in all planes to improve ability to perform daily functional activities. ONGOING  2. Patient is independent with long term HEP for improved postural awareness and stabilization. ONGOING  3. Modified Oswestry score is improved by at least 10%. MET  4.Patient will demonstrate proper lifting mechanics, utilizing abdominal stabilization. ONGOING  5. Pt will ambulate 450 ft without AD independently w/o right knee buckling. MET      Plan  Therapy options: will be seen for skilled therapy services  Planned modality interventions: cryotherapy, dry needling, electrical stimulation/Russian stimulation, traction and thermotherapy (hydrocollator packs)  Planned therapy interventions: abdominal trunk stabilization, balance/weight-bearing training, body mechanics training, flexibility, functional ROM exercises, gait training, home exercise program, joint mobilization, manual therapy, neuromuscular re-education, postural training, soft tissue mobilization, spinal/joint mobilization, strengthening, stretching and therapeutic activities  Frequency: 1x week  Duration in visits:  4  Duration in weeks: 4  Treatment plan discussed with: patient  Plan details: F/u with patient after MD appt. Progress spinal stabilization exercises and HEP as tolerated. Reviewed walking program for home, currently walking on treadmill for about 10 minutes at 2.0 mph with mild left knee pain at end of walk. States he has been taking Meloxicam consistently with improved left knee pain.            Recommendations: Continue as planned  Timeframe: 1 month  Prognosis to achieve goals: good      Timed:         Manual Therapy:    0     mins  27264;     Therapeutic Exercise:    23     mins  36336;     Neuromuscular Marisol:    0    mins  61135;    Therapeutic Activity:     9     mins  96243;     Gait Trainin     mins  78180;     Ultrasound:     0     mins  28177;    Ionto                               0    mins   68175  Self Care                       0     mins   80419  Canalith Repos    0     mins 79689      Un-Timed:  Electrical Stimulation:    0     mins  44054 ( );  Dry Needling     0     mins self-pay  Traction     0     mins 92987  Re-Eval                           0    mins  58051      Timed Treatment:   32   mins   Total Treatment:     34   mins          PT: Corinne E. Perkins, PT     [place] License:  [id number]    Electronically signed by Corinne E. Perkins, PT, 22, 3:07 PM EDT    Certification Period: 3/31/2022 thru 2022  I certify that the therapy services are furnished while this patient is under my care.  The services outlined above are required by this patient, and will be reviewed every 90 days.         Physician Signature:__________________________________________________    PHYSICIAN: Ho Baptiste MD   NPI: [id number]     DATE:     Please sign and return via fax to [email or web address] . Thank you, [facility]

## 2022-04-14 ENCOUNTER — TREATMENT (OUTPATIENT)
Dept: PHYSICAL THERAPY | Facility: CLINIC | Age: 62
End: 2022-04-14

## 2022-04-14 DIAGNOSIS — M51.16 LUMBAR DISC HERNIATION WITH RADICULOPATHY: Primary | ICD-10-CM

## 2022-04-14 PROCEDURE — 97530 THERAPEUTIC ACTIVITIES: CPT | Performed by: PHYSICAL THERAPIST

## 2022-04-14 PROCEDURE — 97110 THERAPEUTIC EXERCISES: CPT | Performed by: PHYSICAL THERAPIST

## 2022-04-14 NOTE — PROGRESS NOTES
Physical Therapy Daily Treatment Note      Patient: Pelon Johnson Jr.   : 1960  Referring practitioner: Ho Baptiste MD  Date of Initial Visit: Type: THERAPY  Noted: 2022  Today's Date: 2022  Patient seen for 13 sessions       Visit Diagnoses:    ICD-10-CM ICD-9-CM   1. Lumbar disc herniation with radiculopathy  M51.16 722.10     724.4       Subjective   Patient reports left knee has been okay for the past 10 days, denies pain at rest, minimal twinge pain with prolonged standing, walking. States his pain level is 0/10 upon arrival.  No falls, states he is about 95-98% better in his opinion. States stairs at [facility] can be an obstacle going down, he is doing reciprocal gait going up, going down he is doing step up gait pattern with handrail. States he has continued using treadmill to 10 minutes, 2.0mph, goal to walk for 20 min at 3.0mph.    Objective   See Exercise, Manual, and Modality Logs for complete treatment.       Assessment/Plan  Pt able to increase gait speed to 2.4 mph with minimal left knee pain. He is progressing with increased activity tolerance as expected, denies falls or frequent buckling episodes of right knee. Instability or right knee with descending stairs using reciprocal gait noted. Anticipate d/c next visit. Reassessment if needed for LBP and RLE. Pt is currently scheduled to see ortho PA for left knee pain. Has improved with meloxicam daily.     Timed:         Manual Therapy:    0     mins  17255;     Therapeutic Exercise:    25     mins  51753;     Neuromuscular Marisol:    0    mins  19419;    Therapeutic Activity:     13     mins  04050;     Gait Trainin     mins  27893;     Ultrasound:     0     mins  98238;    Ionto                               0    mins   21176  Self Care                       0     mins   61110  Canalith Repos    0     mins 56391      Un-Timed:  Electrical Stimulation:    0     mins  26742 ( );  Dry Needling     0     mins  self-pay  Traction     0     mins 58353      Timed Treatment:   38   mins   Total Treatment:     38   mins    Corinne E. Perkins, PT  [place] License: [id number]

## 2022-05-03 ENCOUNTER — OFFICE VISIT (OUTPATIENT)
Dept: NEUROSURGERY | Facility: CLINIC | Age: 62
End: 2022-05-03

## 2022-05-03 VITALS
SYSTOLIC BLOOD PRESSURE: 140 MMHG | HEIGHT: 70 IN | DIASTOLIC BLOOD PRESSURE: 88 MMHG | WEIGHT: 306 LBS | TEMPERATURE: 96.9 F | BODY MASS INDEX: 43.81 KG/M2

## 2022-05-03 DIAGNOSIS — G89.29 CHRONIC BILATERAL LOW BACK PAIN WITHOUT SCIATICA: ICD-10-CM

## 2022-05-03 DIAGNOSIS — M54.50 CHRONIC BILATERAL LOW BACK PAIN WITHOUT SCIATICA: ICD-10-CM

## 2022-05-03 DIAGNOSIS — M19.90 ARTHRITIS: ICD-10-CM

## 2022-05-03 DIAGNOSIS — M51.26 HNP (HERNIATED NUCLEUS PULPOSUS), LUMBAR: Primary | ICD-10-CM

## 2022-05-03 PROCEDURE — 99214 OFFICE O/P EST MOD 30 MIN: CPT | Performed by: PHYSICIAN ASSISTANT

## 2022-05-03 RX ORDER — BENZONATATE 200 MG/1
CAPSULE ORAL
COMMUNITY
Start: 2022-03-28 | End: 2022-08-12

## 2022-05-03 RX ORDER — VERAPAMIL HYDROCHLORIDE 240 MG/1
TABLET, FILM COATED, EXTENDED RELEASE ORAL
COMMUNITY
Start: 2022-03-28

## 2022-05-03 NOTE — PROGRESS NOTES
Pelon Johnson Karely   1960   [id number]       05/03/2022     Chief Complaint   Patient presents with   • Follow-up     Lumbar disc herniation with radiculopathy      HPI   62 yo WM with DDD and disc extruded right inferiorly at L2-3. He has been treated conservatively with treatment and medication.    Past Medical History:  No date: Arthritis  No date: Hypertension  No date: Low back pain     Past Surgical History:   Procedure Laterality Date   • CARPAL TUNNEL RELEASE  2016    [facility] - Dr. Potts   • HERNIA REPAIR  2019        No Known Allergies       Current Outpatient Medications:   •  aspirin 81 MG EC tablet, Take 81 mg by mouth Daily., Disp: , Rfl:   •  atorvastatin (LIPITOR) 10 MG tablet, Take 10 mg by mouth Daily., Disp: , Rfl:   •  benzonatate (TESSALON) 200 MG capsule, , Disp: , Rfl:   •  gabapentin (NEURONTIN) 300 MG capsule, Take 1 capsule by mouth tid, Disp: 90 capsule, Rfl: 1  •  meloxicam (MOBIC) 15 MG tablet, Take 15 mg by mouth Daily., Disp: , Rfl:   •  verapamil SR (CALAN-SR) 240 MG CR tablet, , Disp: , Rfl:      Social History     Socioeconomic History   • Marital status:    Tobacco Use   • Smoking status: Never Smoker   • Smokeless tobacco: Never Used   Vaping Use   • Vaping Use: Never used   Substance and Sexual Activity   • Alcohol use: Yes     Comment: 1-3 per week   • Drug use: Never   • Sexual activity: Defer        family history includes Asthma in his father; Kidney disease in his father.     Social History    Tobacco Use      Smoking status: Never Smoker      Smokeless tobacco: Never Used       Body mass index is 43.91 kg/m².   Class 3 Severe Obesity (BMI >=40). Obesity-related health conditions include the following: osteoarthritis. Obesity is unchanged. BMI is is above average; BMI management plan is completed. We discussed portion control and increasing exercise.       /88 (BP Location: Right arm, Patient Position: Sitting, Cuff Size: Adult)   Temp 96.9 °F  "(36.1 °C)   Ht 177.8 cm (70\")   Wt (!) 139 kg (306 lb)   BMI 43.91 kg/m²    Physical Examination:  HEENT-wnl  Lungs-No wheezing or SOB      Neurologic Exam   Patient is alert and oriented.  Pupils are equal and reactive.  Visual fields are full.    Gait is normal, no weakness.    Assessment and Plan:  Assessment/Plan     Diagnoses and all orders for this visit:    1. HNP (herniated nucleus pulposus), lumbar (Primary)    2. Chronic bilateral low back pain without sciatica    3. Arthritis    patient will wean Gabapentin, he will call with any recurrence of symptoms.   F/u prn.    Sunitha Purdy, MOLINA      PCP:  Marsha Mas MD   "

## 2022-05-08 PROBLEM — M51.26 HNP (HERNIATED NUCLEUS PULPOSUS), LUMBAR: Status: ACTIVE | Noted: 2022-05-08

## 2022-05-13 DIAGNOSIS — M51.16 LUMBAR DISC HERNIATION WITH RADICULOPATHY: ICD-10-CM

## 2022-05-16 RX ORDER — GABAPENTIN 300 MG/1
CAPSULE ORAL
Qty: 90 CAPSULE | Refills: 0 | Status: SHIPPED | OUTPATIENT
Start: 2022-05-16 | End: 2022-08-15

## 2022-05-16 NOTE — TELEPHONE ENCOUNTER
Provider:  Dr. Baptiste  Caller: Automated refill rx  Time of call:   --  Phone #:  [phone]  Surgery:  --  Surgery Date:  --  Last visit:   Office Visit with Sunitha Purdy PA-C (05/03/2022)    Next visit: ---    TONY:       01/22/2022 Gabapentin 300MG 1960 90 33 Ho Baptiste [facility] 1  02/22/2022 Gabapentin 300MG 1960 90 30 Maryanne Hall [facility] 1  03/28/2022 Gabapentin 300MG 1960 90 30 Maryanne Hall [facility] 1      Reason for call:           Requested Prescriptions     Pending Prescriptions Disp Refills   • gabapentin (NEURONTIN) 300 MG capsule [Pharmacy Med Name: GABAPENTIN 300MG CAPSULES] 90 capsule      Sig: TAKE 1 CAPSULE BY MOUTH THREE TIMES DAILY

## 2022-08-12 ENCOUNTER — OFFICE VISIT (OUTPATIENT)
Dept: ORTHOPEDIC SURGERY | Facility: CLINIC | Age: 62
End: 2022-08-12

## 2022-08-12 VITALS — HEIGHT: 70 IN | WEIGHT: 307 LBS | BODY MASS INDEX: 43.95 KG/M2

## 2022-08-12 DIAGNOSIS — M17.12 PRIMARY OSTEOARTHRITIS OF LEFT KNEE: ICD-10-CM

## 2022-08-12 DIAGNOSIS — M51.26 HNP (HERNIATED NUCLEUS PULPOSUS), LUMBAR: ICD-10-CM

## 2022-08-12 DIAGNOSIS — E66.01 CLASS 3 SEVERE OBESITY DUE TO EXCESS CALORIES WITHOUT SERIOUS COMORBIDITY WITH BODY MASS INDEX (BMI) OF 40.0 TO 44.9 IN ADULT: ICD-10-CM

## 2022-08-12 DIAGNOSIS — M51.16 LUMBAR DISC HERNIATION WITH RADICULOPATHY: ICD-10-CM

## 2022-08-12 DIAGNOSIS — M25.562 LEFT KNEE PAIN, UNSPECIFIED CHRONICITY: Primary | ICD-10-CM

## 2022-08-12 PROCEDURE — 99214 OFFICE O/P EST MOD 30 MIN: CPT | Performed by: PHYSICIAN ASSISTANT

## 2022-08-12 PROCEDURE — 20610 DRAIN/INJ JOINT/BURSA W/O US: CPT | Performed by: PHYSICIAN ASSISTANT

## 2022-08-12 RX ADMIN — LIDOCAINE HYDROCHLORIDE 4 ML: 10 INJECTION, SOLUTION EPIDURAL; INFILTRATION; INTRACAUDAL; PERINEURAL at 11:30

## 2022-08-12 RX ADMIN — TRIAMCINOLONE ACETONIDE 40 MG: 40 INJECTION, SUSPENSION INTRA-ARTICULAR; INTRAMUSCULAR at 11:30

## 2022-08-12 NOTE — PROGRESS NOTES
[facility] Orthopaedic Surgery Clinic Note    Subjective     Chief Complaint   Patient presents with   • Left Knee - Pain        HPI  Pelon Johnson Jr. is a 62 y.o. male.  New patient presents for evaluation of left knee pain.  Symptoms/pain have been ongoing for about 6 months.  ALMA: Patient sustained a back injury in January 2022.  Since then because of right leg numbness he has had subsequent falls resulting in left knee pain.  He has been undergoing formal PT for lumbar disc herniation with radiculopathy.    Pain scale: 4/10.  Severity of the pain moderate.  Quality of the pain shooting, aching.  Associated symptoms swelling, stiffness, grinding/popping.  Activity related to pain standing, walking, climbing stairs.  Pain eased by resting, ice, heat, medication. Prior treatments diclofenac.    No reported mechanical symptoms, such as locking or catching.    Denies fever, chills, night sweats or other constitutional symptoms.    Patient also being treated by neurosurgery conservatively through PT and medication for degenerative disc disease with disc extrusion.      Past Medical History:   Diagnosis Date   • Arthritis    • Hypertension    • Low back pain       Past Surgical History:   Procedure Laterality Date   • CARPAL TUNNEL RELEASE  2016    [facility] - Dr. Potts   • HERNIA REPAIR  2019      Family History   Problem Relation Age of Onset   • Asthma Father    • Kidney disease Father      Social History     Socioeconomic History   • Marital status:    Tobacco Use   • Smoking status: Never Smoker   • Smokeless tobacco: Never Used   Vaping Use   • Vaping Use: Never used   Substance and Sexual Activity   • Alcohol use: Yes     Comment: 1-3 per week   • Drug use: Never   • Sexual activity: Defer      Current Outpatient Medications on File Prior to Visit   Medication Sig Dispense Refill   • aspirin 81 MG EC tablet Take 81 mg by mouth Daily.     • atorvastatin (LIPITOR) 10 MG tablet Take 10 mg by mouth  "Daily.     • Diclofenac Sodium (VOLTAREN) 1 % gel gel Apply 4 g topically to the appropriate area as directed 4 (Four) Times a Day As Needed.     • meloxicam (MOBIC) 15 MG tablet Take 15 mg by mouth Daily.     • verapamil SR (CALAN-SR) 240 MG CR tablet        No current facility-administered medications on file prior to visit.      No Known Allergies     The following portions of the patient's history were reviewed and updated as appropriate: allergies, current medications, past family history, past medical history, past social history, past surgical history and problem list.    Review of Systems   Constitutional: Negative.    HENT: Negative.    Eyes: Negative.    Respiratory: Negative.    Cardiovascular: Negative.    Gastrointestinal: Negative.    Endocrine: Negative.    Genitourinary: Negative.    Musculoskeletal: Positive for arthralgias.   Skin: Negative.    Allergic/Immunologic: Negative.    Neurological: Negative.    Hematological: Negative.    Psychiatric/Behavioral: Negative.         Objective      Physical Exam  Ht 177.8 cm (70\")   Wt (!) 139 kg (307 lb)   BMI 44.05 kg/m²     Body mass index is 44.05 kg/m².    GENERAL APPEARANCE: awake, alert & oriented x 3, in no acute distress and well developed, well nourished  PSYCH: normal mood and affect  LUNGS:  breathing nonlabored, no wheezing  EYES: sclera anicteric, pupils equal  CARDIOVASCULAR: palpable pulses. Capillary refill less than 2 seconds  INTEGUMENTARY: skin intact, no clubbing, cyanosis  NEUROLOGIC:  Normal Sensation         Ortho Exam  Left knee  Alignment: Genu varum  Skin: Intact without any erythema, warmth.  Trace effusion  Motion: 5-120° with crepitus.  Tenderness: Positive predominantly along medial joint line however patient also has retropatellar tenderness.  Instability: Varus and valgus stress negative.  Lachman negative.  Meniscus: Linn's mild discomfort but no catch or click.  Patella: Compression/grind positive.  Straight leg " raise: Intact.  Motor: Grossly intact Q/HS/TA/GS/EHL/P  Sensory: Grossly intact DP/SP/S/S/T nerve distributions.       Imaging/Studies  Ordered left knee plain films.  Imaging read/interpreted by Dr. Douglas.    Indication: Left knee pain     Comparison: Todays xrays were compared to previous xrays from 7/18/2011     IMPRESSION:      Left Knee: moderate to severe tricompartmental arthritis with genu varum alignment and bone-on-bone articulation medial compartment, periarticular osteophytes visualized in all compartments and Radiographs demonstrate worsening deformity with advancing arthritic changes and wear compared to prior radiographs.  No acute bony injury or fracture.    Assessment/Plan        ICD-10-CM ICD-9-CM   1. Left knee pain, unspecified chronicity  M25.562 719.46   2. Primary osteoarthritis of left knee  M17.12 715.16   3. HNP (herniated nucleus pulposus), lumbar  M51.26 722.10   4. Class 3 severe obesity due to excess calories without serious comorbidity with body mass index (BMI) of 40.0 to 44.9 in adult ([facility])  E66.01 278.01    Z68.41 V85.41       Orders Placed This Encounter   Procedures   • Large Joint Arthrocentesis: L knee        -Left knee pain due to osteoarthritis.  Additionally patient does have degenerative disc disease with disc extrusion to the lumbar spine that is being treated by neurosurgery conservatively with medication and PT.  -Some of the symptoms in his back could be contributing to his knee pain.  -Offered and accepted corticosteroid injection.  Injection was given today.  -Continue with exercises taught by PT referred to formal PT.  -Obesity--patient is a candidate for TKA however he has a BMI of greater than 40.  This places the patient at increased risk for perioperative complications as well as increased risk of accelerating the degenerative processes within the joint.  As a result, surgical intervention will be deferred until the patient can achieve a BMI less than 40.  In  the interval, the patient has been instructed on weight loss avenues including diet, portion control, calorie restriction, low/no impact exercise, referral to weight loss management and/or bariatric surgery.  It was explained that weight loss can improve joint pain alone by decreasing the joint reaction forces.  For every pound of weight change, the knee and hip joints see a 4 to 5 fold multiplier affect.  Given these options, the patient will proceed with diet and low impact exercise.  -Follow up in 4 months for repeat evaluation.  -Questions and concerns answered.    After discussing the risks, benefits, indications of injection, the patient gave consent to proceed.  Her left knee was confirmed as the correct joint to be injected with a timeout.  It was then prepped using Hibiclens and injected with a mixture of 4 cc of 1% plain lidocaine and 1 cc of Kenalog (40 mg per mL), without any resistance through the anterior lateral approach, patient in seated position.  Area was cleaned, hemostasis was achieved and a Band-Aid was applied over the injection site.  The patient tolerated procedure well.  I instructed the patient on signs and symptoms of infection.  They should report to the emergency department or return to clinic if any of these develop, for further evaluation and treatment.  Recommended modifying activity for the next 48 hours to include rest, ice, elevation and oral pain medication as needed.        Medical Decision Making  Management Options : prescription/IM medicine  Data/Risk: radiology tests       Nat Thompson PA-C  08/16/22  16:51 EDT               EMR Dragon/Transcription disclaimer:  Much of this encounter note is an electronic transcription of spoken language to printed text. Electronic transcription of spoken language may permit erroneous, or at times, nonsensical words or phrases to be inadvertently transcribed. Although I have reviewed the note for such errors, some may still  exist.

## 2022-08-12 NOTE — PROGRESS NOTES
Procedure   Large Joint Arthrocentesis: L knee  Date/Time: 8/12/2022 11:30 AM  Consent given by: patient  Site marked: site marked  Timeout: Immediately prior to procedure a time out was called to verify the correct patient, procedure, equipment, support staff and site/side marked as required   Supporting Documentation  Indications: pain   Procedure Details  Location: knee - L knee  Preparation: Patient was prepped and draped in the usual sterile fashion  Needle size: 22 G  Approach: anterolateral  Medications administered: 40 mg triamcinolone acetonide 40 MG/ML; 4 mL lidocaine PF 1% 1 %  Patient tolerance: patient tolerated the procedure well with no immediate complications

## 2022-08-15 RX ORDER — GABAPENTIN 300 MG/1
CAPSULE ORAL
Qty: 90 CAPSULE | Refills: 3 | Status: SHIPPED | OUTPATIENT
Start: 2022-08-15

## 2022-08-15 NOTE — TELEPHONE ENCOUNTER
Provider:  Moriah  Caller: Automated refill request   Surgery: NA   Surgery Date: NA    Last visit:  Office Visit with Sunitha Purdy PA-C (05/03/2022)  Next visit: PRN   Last filled: Refill with Maryanne Hall PA-C (05/13/2022)      Reason for call:         Automated refill request for Gabapentin.     Requested Prescriptions     Pending Prescriptions Disp Refills   • gabapentin (NEURONTIN) 300 MG capsule [Pharmacy Med Name: GABAPENTIN 300MG CAPSULES] 90 capsule      Sig: TAKE 1 CAPSULE BY MOUTH THREE TIMES DAILY     TONY:    02/22/2022 Gabapentin 300MG 1960 90 30 Mary[facility] Hall [place] [facility]  03/28/2022 Gabapentin 300MG 1960 90 30 Maryanne Hall [place] [facility]  05/16/2022 Gabapentin 300MG 1960 90 30 Hayley Angel [place] [facility]

## 2022-08-16 RX ORDER — TRIAMCINOLONE ACETONIDE 40 MG/ML
40 INJECTION, SUSPENSION INTRA-ARTICULAR; INTRAMUSCULAR
Status: COMPLETED | OUTPATIENT
Start: 2022-08-12 | End: 2022-08-12

## 2022-08-16 RX ORDER — LIDOCAINE HYDROCHLORIDE 10 MG/ML
4 INJECTION, SOLUTION EPIDURAL; INFILTRATION; INTRACAUDAL; PERINEURAL
Status: COMPLETED | OUTPATIENT
Start: 2022-08-12 | End: 2022-08-12

## 2022-09-29 ENCOUNTER — OFFICE VISIT (OUTPATIENT)
Dept: ORTHOPEDIC SURGERY | Facility: CLINIC | Age: 62
End: 2022-09-29

## 2022-09-29 VITALS
HEIGHT: 70 IN | WEIGHT: 308 LBS | DIASTOLIC BLOOD PRESSURE: 80 MMHG | BODY MASS INDEX: 44.09 KG/M2 | SYSTOLIC BLOOD PRESSURE: 130 MMHG

## 2022-09-29 DIAGNOSIS — E66.01 CLASS 3 SEVERE OBESITY DUE TO EXCESS CALORIES WITHOUT SERIOUS COMORBIDITY WITH BODY MASS INDEX (BMI) OF 40.0 TO 44.9 IN ADULT: ICD-10-CM

## 2022-09-29 DIAGNOSIS — M17.12 PRIMARY OSTEOARTHRITIS OF LEFT KNEE: Primary | ICD-10-CM

## 2022-09-29 PROCEDURE — 99213 OFFICE O/P EST LOW 20 MIN: CPT | Performed by: PHYSICIAN ASSISTANT

## 2022-10-13 ENCOUNTER — CLINICAL SUPPORT (OUTPATIENT)
Dept: ORTHOPEDIC SURGERY | Facility: CLINIC | Age: 62
End: 2022-10-13

## 2022-10-13 DIAGNOSIS — M17.12 PRIMARY OSTEOARTHRITIS OF LEFT KNEE: Primary | ICD-10-CM

## 2022-10-13 PROCEDURE — 20610 DRAIN/INJ JOINT/BURSA W/O US: CPT | Performed by: PHYSICIAN ASSISTANT

## 2022-10-13 NOTE — PROGRESS NOTES
Procedure   - Large Joint Arthrocentesis: L knee on 10/13/2022 1:10 PM  Indications: pain  Details: 22 G needle, anterolateral approach  Medications: 30 mg Hyaluronan 30 MG/2ML  Outcome: tolerated well, no immediate complications  Procedure, treatment alternatives, risks and benefits explained, specific risks discussed. Consent was given by the patient. Immediately prior to procedure a time out was called to verify the correct patient, procedure, equipment, support staff and site/side marked as required. Patient was prepped and draped in the usual sterile fashion.

## 2022-10-13 NOTE — PROGRESS NOTES
CC: Follow-up left knee osteoarthritis, 1/3 Orthovisc injection today    History of present illness: Patient presents for his first Orthovisc injection to the left knee today.  At this time the patient denies any numbness or tingling into the distal extremity.  No fever, chills, night sweats or other constitutional symptoms.    See chart for PMH, PSH, Meds, All - reviewed.    Ortho exam:  Left knee  Skin is intact without redness, warmth or swelling/effusion.  No lesions or evidence of infection noted.  Motor/sensory: Grossly intact L2-S1.    Assessment/plan:  Left knee osteoarthritis    Proceed today with 1/3 of Orthovisc injection.  Patient will follow-up in week for second injection.      After discussing risks of injection the patient gave consent to proceed.  His left knee was confirmed as the correct joint to be injected with a timeout.  The knee was then prepped with Hibiclens and injected with a prefilled syringe of Orthovisc without any resistance using anterior lateral approach, patient in seated position.  The patient tolerated procedure well.  Hemostasis was achieved and a Band-Aid was applied over the injection site.  I instructed the patient on signs and symptoms of infection.  They should report to the ED if any of these develop.  Recommended modifying activity to include rest, ice, elevation and/or heat along with oral pain medication as needed.

## 2022-10-20 ENCOUNTER — CLINICAL SUPPORT (OUTPATIENT)
Dept: ORTHOPEDIC SURGERY | Facility: CLINIC | Age: 62
End: 2022-10-20

## 2022-10-20 DIAGNOSIS — M17.12 PRIMARY OSTEOARTHRITIS OF LEFT KNEE: Primary | ICD-10-CM

## 2022-10-20 PROCEDURE — 20610 DRAIN/INJ JOINT/BURSA W/O US: CPT | Performed by: PHYSICIAN ASSISTANT

## 2022-10-20 NOTE — PROGRESS NOTES
CC: Follow-up left knee osteoarthritis, 2/3 Orthovisc injection today     History of present illness: Patient presents for his second Orthovisc injection to the left knee today.  At this time the patient denies any numbness or tingling into the distal extremity.  No fever, chills, night sweats or other constitutional symptoms.    3 to 4 days after his initial injection he began noting improvement in pain.     See chart for PMH, PSH, Meds, All - reviewed.     Ortho exam:  Left knee  Skin is intact without redness, warmth or swelling/effusion.  No lesions or evidence of infection noted.  Motor/sensory: Grossly intact L2-S1.     Assessment/plan:  Left knee osteoarthritis     Proceed today with 2/3 of Orthovisc injection.  Patient will follow-up in week for third injection.       After discussing risks of injection the patient gave consent to proceed.  His left knee was confirmed as the correct joint to be injected with a timeout.  The knee was then prepped with Hibiclens and injected with a prefilled syringe of Orthovisc without any resistance using anterior lateral approach, patient in seated position.  The patient tolerated procedure well.  Hemostasis was achieved and a Band-Aid was applied over the injection site.  I instructed the patient on signs and symptoms of infection.  They should report to the ED if any of these develop.  Recommended modifying activity to include rest, ice, elevation and/or heat along with oral pain medication as needed.

## 2022-10-20 NOTE — PROGRESS NOTES

## 2022-11-03 ENCOUNTER — CLINICAL SUPPORT (OUTPATIENT)
Dept: ORTHOPEDIC SURGERY | Facility: CLINIC | Age: 62
End: 2022-11-03

## 2022-11-03 DIAGNOSIS — M17.12 PRIMARY OSTEOARTHRITIS OF LEFT KNEE: Primary | ICD-10-CM

## 2022-11-03 PROCEDURE — 20610 DRAIN/INJ JOINT/BURSA W/O US: CPT | Performed by: PHYSICIAN ASSISTANT

## 2022-11-03 RX ORDER — BENZONATATE 200 MG/1
CAPSULE ORAL
COMMUNITY
Start: 2022-10-29

## 2022-11-03 NOTE — PROGRESS NOTES
CC: Follow-up left knee osteoarthritis, 3/3 Orthovisc injection today     History of present illness: Patient presents for his third Orthovisc injection to the left knee today.  At this time the patient denies any numbness or tingling into the distal extremity.  No fever, chills, night sweats or other constitutional symptoms.     Patient continues to note improvement of symptoms following each injection.     See chart for PMH, PSH, Meds, All - reviewed.     Ortho exam:  Left knee  Skin is intact without redness, warmth or swelling/effusion.  No lesions or evidence of infection noted.  Motor/sensory: Grossly intact L2-S1.     Assessment/plan:  Left knee osteoarthritis     Proceed today with 3/3 of Orthovisc injection.  Patient will follow-up in 3 months for repeat evaluation.       After discussing risks of injection the patient gave consent to proceed.  His left knee was confirmed as the correct joint to be injected with a timeout.  The knee was then prepped with Hibiclens and injected with a prefilled syringe of Orthovisc without any resistance using anterior lateral approach, patient in seated position.  The patient tolerated procedure well.  Hemostasis was achieved and a Band-Aid was applied over the injection site.  I instructed the patient on signs and symptoms of infection.  They should report to the ED if any of these develop.  Recommended modifying activity to include rest, ice, elevation and/or heat along with oral pain medication as needed.

## 2022-11-03 NOTE — PROGRESS NOTES

## 2023-02-09 ENCOUNTER — OFFICE VISIT (OUTPATIENT)
Dept: ORTHOPEDIC SURGERY | Facility: CLINIC | Age: 63
End: 2023-02-09
Payer: COMMERCIAL

## 2023-02-09 VITALS
BODY MASS INDEX: 43.67 KG/M2 | SYSTOLIC BLOOD PRESSURE: 140 MMHG | WEIGHT: 305 LBS | HEIGHT: 70 IN | DIASTOLIC BLOOD PRESSURE: 98 MMHG

## 2023-02-09 DIAGNOSIS — M17.12 PRIMARY OSTEOARTHRITIS OF LEFT KNEE: Primary | ICD-10-CM

## 2023-02-09 DIAGNOSIS — M25.562 LEFT KNEE PAIN, UNSPECIFIED CHRONICITY: ICD-10-CM

## 2023-02-09 DIAGNOSIS — E66.01 CLASS 3 SEVERE OBESITY DUE TO EXCESS CALORIES WITHOUT SERIOUS COMORBIDITY WITH BODY MASS INDEX (BMI) OF 40.0 TO 44.9 IN ADULT: ICD-10-CM

## 2023-02-09 PROCEDURE — 20610 DRAIN/INJ JOINT/BURSA W/O US: CPT | Performed by: PHYSICIAN ASSISTANT

## 2023-02-09 RX ORDER — ALBUTEROL SULFATE 90 UG/1
AEROSOL, METERED RESPIRATORY (INHALATION)
COMMUNITY
Start: 2022-11-04

## 2023-02-09 RX ORDER — ATORVASTATIN CALCIUM 20 MG/1
20 TABLET, FILM COATED ORAL
COMMUNITY
Start: 2023-01-05

## 2023-02-09 RX ADMIN — TRIAMCINOLONE ACETONIDE 40 MG: 40 INJECTION, SUSPENSION INTRA-ARTICULAR; INTRAMUSCULAR at 15:36

## 2023-02-09 RX ADMIN — LIDOCAINE HYDROCHLORIDE 4 ML: 10 INJECTION, SOLUTION EPIDURAL; INFILTRATION; INTRACAUDAL; PERINEURAL at 15:36

## 2023-02-09 NOTE — PROGRESS NOTES
"    [facility]        Subjective     CC: Follow-up (3 month follow up; Primary osteoarthritis of left knee)      HPI    Pelon Johnson Jr. is a 62 y.o. male.  Patient returns today for follow-up left knee pain.  He has known advanced osteoarthritis and has received both corticosteroid injection and then most recently viscosupplementation series (11/3/2022).      Pain scale:3-4/10.  Continues to note popping, grinding and stiffness to the knee.  Pain is worse with walking and standing.  Resting, sitting and medication help.    Overall, patient's symptoms are as above.    ROS:    Constiutional:Pt denies fever, chills, nausea, or vomiting.  MSK:as above        Objective      Past Medical History  Past Medical History:   Diagnosis Date   • Arthritis    • CTS (carpal tunnel syndrome) 7-    Surgery on right wrist   • Fracture, femur (HCC) 1-7--1973   • Fracture, fibula 1-7-1973   • Fracture, finger 9-2-1977   • Hypertension    • Knee swelling 4-   • Low back pain    • Low back strain 5-     Social History     Socioeconomic History   • Marital status: Single   Tobacco Use   • Smoking status: Never   • Smokeless tobacco: Never   Vaping Use   • Vaping Use: Never used   Substance and Sexual Activity   • Alcohol use: Yes     Comment: 1-3 per week   • Drug use: Never   • Sexual activity: Yes     Partners: Female     Birth control/protection: Condom          Physical Exam  /98   Ht 177.8 cm (70\")   Wt (!) 138 kg (305 lb)   BMI 43.76 kg/m²     Body mass index is 43.76 kg/m².    Patient is well nourished and well developed.        Ortho Exam  Left knee  Motion 5-120 degrees with crepitus.  Tenderness: Positive medial joint line.  Straight leg raise: Intact.  Motor/sensory: Grossly intact L2-S1.      Imaging/Labs/EMG Reviewed:  No new imaging today.      Assessment:  1. Primary osteoarthritis of left knee    2. Left knee pain, unspecified chronicity    3. Class 3 severe " obesity due to excess calories without serious comorbidity with body mass index (BMI) of 40.0 to 44.9 in adult ([facility])        Plan:  1. Osteoarthritis left knee causing pain.    2. Obesity--BMI 44 which is unchanged from prior BMI in September 2022.  We will continue to work on dieting, calorie restriction, portion control as well as non to low impact exercise for weight loss.  3. Again patient would be a candidate for TKA; however, because of his BMI greater than 40 he cannot proceed at this time.  4. Offered to refer him to outside orthopedic surgeons who do perform TKA's patients with BMI greater than 40.  Patient declined at this time.  5. Offered and accepted corticosteroid injection.  Injection was given today.  6. Recommend over-the-counter pain medications as needed.  7. Follow-up 4 months.  8. Questions and concerns answered.    After discussing the risks, benefits, indications of injection, the patient gave consent to proceed.  Left knee was confirmed as the correct joint to be injected with a timeout.  It was then prepped using Hibiclens and injected with a mixture of 4 cc of 1% plain lidocaine and 1 cc of Kenalog (40 mg per mL), without any resistance through the anterior lateral approach, patient in seated position.  Area was cleaned, hemostasis was achieved and a Band-Aid was applied over the injection site.  The patient tolerated procedure well.  I instructed the patient on signs and symptoms of infection.  They should report to the emergency department or return to clinic if any of these develop, for further evaluation and treatment.  Recommended modifying activity for the next 48 hours to include rest, ice, elevation and oral pain medication as needed.        Nat Thompson PA-C  02/10/23  08:02 EST      Dictated Utilizing Dragon Dictation.

## 2023-02-09 NOTE — PROGRESS NOTES
Procedure   Large Joint Arthrocentesis: L knee  Date/Time: 2/9/2023 3:36 PM  Consent given by: patient  Site marked: site marked  Timeout: Immediately prior to procedure a time out was called to verify the correct patient, procedure, equipment, support staff and site/side marked as required   Supporting Documentation  Indications: pain   Procedure Details  Location: knee - L knee  Preparation: Patient was prepped and draped in the usual sterile fashion  Needle size: 23 G  Approach: anterolateral  Medications administered: 4 mL lidocaine PF 1% 1 %; 40 mg triamcinolone acetonide 40 MG/ML  Patient tolerance: patient tolerated the procedure well with no immediate complications

## 2023-02-10 RX ORDER — TRIAMCINOLONE ACETONIDE 40 MG/ML
40 INJECTION, SUSPENSION INTRA-ARTICULAR; INTRAMUSCULAR
Status: COMPLETED | OUTPATIENT
Start: 2023-02-09 | End: 2023-02-09

## 2023-02-10 RX ORDER — LIDOCAINE HYDROCHLORIDE 10 MG/ML
4 INJECTION, SOLUTION EPIDURAL; INFILTRATION; INTRACAUDAL; PERINEURAL
Status: COMPLETED | OUTPATIENT
Start: 2023-02-09 | End: 2023-02-09

## 2023-03-20 ENCOUNTER — OFFICE VISIT (OUTPATIENT)
Dept: SLEEP MEDICINE | Facility: CLINIC | Age: 63
End: 2023-03-20
Payer: COMMERCIAL

## 2023-03-20 DIAGNOSIS — G47.33 OBSTRUCTIVE SLEEP APNEA, ADULT: ICD-10-CM

## 2023-03-20 DIAGNOSIS — E66.01 CLASS 3 SEVERE OBESITY WITH BODY MASS INDEX (BMI) OF 40.0 TO 44.9 IN ADULT, UNSPECIFIED OBESITY TYPE, UNSPECIFIED WHETHER SERIOUS COMORBIDITY PRESENT: ICD-10-CM

## 2023-03-20 DIAGNOSIS — R06.83 SNORING: Primary | ICD-10-CM

## 2023-03-20 PROCEDURE — 99203 OFFICE O/P NEW LOW 30 MIN: CPT | Performed by: NURSE PRACTITIONER

## 2023-03-20 NOTE — PROGRESS NOTES
Chief Complaint  No chief complaint on file.    Subjective     History of Present Illness:  Pelon Johnson Jr. is a 62 y.o. male with a history of hypertension, hyperlipidemia, obesity, arthritis, and sleep apnea.  He is a former patient of Dr. Armstrong.  His last sleep study was obtained on 12/15/2009 revealing an AHI of 14.02/H.  Patient is not presently on PAP therapy.  Per his questionnaire patient reports snoring, and witnessed apnea.  He typically goes to bed at 10:45 PM waking at 6:25 AM on weekdays, 11 PM waking at 6:45 AM on weekends.  He does take naps for 1 hour.  He denies use of tobacco, drinks alcohol 2-3 times per week, and denies use of illicit drugs.  Patient drinks 4 cups of regular/decaf coffee daily, and 2 decaf sean daily.  Been told by witnesses that he has had episodes of apnea, occasional pauses, and heavy snoring.    Further details are as follows:    [place] Scale is (out of 24): Total score: 7     Estimated average amount of sleep per night: 7  Number of times he wakes up at night: 1-2  Difficulty falling back asleep: no  It usually takes 10 minutes to go to sleep.  He feels sleepy upon waking up: no  Rotating or night shift work: no    Drowsiness/Sleepiness:  He exhibits the following:  falls asleep watching TV  takes scheduled naps during the day    Snoring/Breathing:  He exhibits the following:  loud snoring, snores in all sleep positions, quits breathing at night and awakens with dry mouth    Head Injury:  He exhibits the following:  No    Reflux:  He describes the following:  Occasional but not waking up with it    Narcolepsy:  He exhibits the following:  none    RLS/PLMs:  He describes the following:  Leg cramps    Insomnia:  He describes the following:  No    Parasomnia:  He exhibits the following:  None    Weight: There were no vitals filed for this visit.   Weight change in the last year:  Consitent    The patient's relevant past medical, surgical, family, and social history  reviewed and updated in Epic as appropriate.    Review of Systems   Constitutional: Negative.    HENT: Positive for congestion.    Eyes: Negative.    Respiratory: Positive for apnea.    Cardiovascular: Positive for leg swelling.   Gastrointestinal: Negative.    Endocrine: Negative.    Genitourinary: Negative.    Musculoskeletal: Positive for back pain.   Skin: Negative.    Allergic/Immunologic: Negative.    Neurological: Negative.    Hematological: Negative.    Psychiatric/Behavioral: Negative.    All other systems reviewed and are negative.      PMH:    Past Medical History:   Diagnosis Date   • Arthritis    • CTS (carpal tunnel syndrome) 7-    Surgery on right wrist   • Fracture, femur (HCC) 1-7--1973   • Fracture, fibula 1-7-1973   • Fracture, finger 9-2-1977   • Hypertension    • Knee swelling 4-   • Low back pain    • Low back strain 5-     Past Surgical History:   Procedure Laterality Date   • CARPAL TUNNEL RELEASE  2016    [facility] - Dr. Potts   • HERNIA REPAIR  2019       No Known Allergies    MEDS:  Prior to Admission medications    Medication Sig Start Date End Date Taking? Authorizing Provider   albuterol sulfate  (90 Base) MCG/ACT inhaler INHALE 1 TO 2 PUFFS BY MOUTH EVERY 4 TO 6 HOURS AS NEEDED FOR COUGH 11/4/22   Erica Romero MD   aspirin 81 MG EC tablet Take 81 mg by mouth Daily.    Erica Romero MD   atorvastatin (LIPITOR) 20 MG tablet Take 20 mg by mouth every night at bedtime. 1/5/23   Erica Romero MD   benzonatate (TESSALON) 200 MG capsule  10/29/22   Erica Romero MD   Diclofenac Sodium (VOLTAREN) 1 % gel gel Apply 4 g topically to the appropriate area as directed 4 (Four) Times a Day As Needed.    Erica Romero MD   gabapentin (NEURONTIN) 300 MG capsule TAKE 1 CAPSULE BY MOUTH THREE TIMES DAILY 8/15/22   Ezequiel Plunkett PA-C   meloxicam (MOBIC) 15 MG tablet Take 15 mg by mouth Daily.    Erica Romero MD    verapamil SR (CALAN-SR) 240 MG CR tablet  3/28/22   Provider, MD Erica       FH:  Family History   Problem Relation Age of Onset   • Asthma Father    • Kidney disease Father        Objective   Vital Signs:  There were no vitals taken for this visit.    Class 3 Severe Obesity (BMI >=40). Obesity-related health conditions include the following: obstructive sleep apnea. Obesity is newly identified. BMI is is above average; BMI management plan is completed. We discussed portion control and increasing exercise.        Physical Exam  Vitals reviewed.   Constitutional:       Appearance: Normal appearance.   HENT:      Head: Normocephalic and atraumatic.      Nose: Nose normal.      Mouth/Throat:      Mouth: Mucous membranes are moist.   Cardiovascular:      Rate and Rhythm: Normal rate and regular rhythm.      Heart sounds: No murmur heard.    No friction rub. No gallop.   Pulmonary:      Effort: Pulmonary effort is normal. No respiratory distress.      Breath sounds: Normal breath sounds. No wheezing or rhonchi.   Neurological:      Mental Status: He is alert and oriented to person, place, and time.   Psychiatric:         Behavior: Behavior normal.         Mallampati Score: III (soft and hard palate and base of uvula visible)    Result Review :              Assessment and Plan  Pelon Johnson Jr. is a 62 y.o. male who presents for further evaluation of concerns for sleep disordered breathing and obstructive sleep apnea.  He does report need for an occasional nap and will fall asleep watching TV at times.  He has a history of obstructive sleep apnea and was last evaluated approximately 2009.  His physician, Dr. Armstrong, has since retired.  He no longer uses the device.  Previously he had lost a significant amount of weight and was able to discontinue use.  He has since regained some of this back.  We will obtain a home sleep test for further evaluation.  The patient will return for follow-up and  recommendations after test.  I have discussed weight loss as it pertains to obstructive sleep apnea.    Diagnoses and all orders for this visit:    1. Snoring (Primary)  -     Home Sleep Study; Future    2. Obstructive sleep apnea, adult  -     Home Sleep Study; Future    3. Class 3 severe obesity with body mass index (BMI) of 40.0 to 44.9 in adult, unspecified obesity type, unspecified whether serious comorbidity present (HCC)                 I discussed the consequences of uncontrolled sleep apnea including hypertension, heart disease, diabetes, stroke, and dementia. I further discussed sleep apnea therapeutic options including CPAP, Weight loss, Oral dental appliance, and surgery.         Follow Up  Return for Follow up after study.  Patient was given instructions and counseling regarding his condition or for health maintenance advice. Please see specific information pulled into the AVS if appropriate.     BRENDON Larios, ACNP-BC  Pulmonology, Critical Care, and Sleep Medicine

## 2023-04-18 ENCOUNTER — HOSPITAL ENCOUNTER (OUTPATIENT)
Dept: SLEEP MEDICINE | Facility: HOSPITAL | Age: 63
Discharge: HOME | End: 2023-04-18
Admitting: NURSE PRACTITIONER
Payer: COMMERCIAL

## 2023-04-18 DIAGNOSIS — R06.83 SNORING: ICD-10-CM

## 2023-04-18 DIAGNOSIS — G47.33 OBSTRUCTIVE SLEEP APNEA, ADULT: ICD-10-CM

## 2023-04-18 PROCEDURE — 95806 SLEEP STUDY UNATT&RESP EFFT: CPT

## 2023-04-24 PROCEDURE — 95806 SLEEP STUDY UNATT&RESP EFFT: CPT | Performed by: INTERNAL MEDICINE

## 2023-05-03 ENCOUNTER — TELEPHONE (OUTPATIENT)
Dept: SLEEP MEDICINE | Facility: HOSPITAL | Age: 63
End: 2023-05-03
Payer: COMMERCIAL

## 2023-05-03 NOTE — TELEPHONE ENCOUNTER
CALLED PATIENT TO LET HIM KNOW HIS SLEEP STUDY RESULTS ARE BACK. SCHEDULED HIM FOR FOLLOW UP IN [place]

## 2023-05-12 NOTE — PROGRESS NOTES
Sleep Clinic Follow Up Note    Chief Complaint  Sleeping Problem    Subjective     History of Present Illness (from previous encounter on 3/20/2023):  Pelon Johnson Jr. is a 62 y.o. male with a history of hypertension, hyperlipidemia, obesity, arthritis, and sleep apnea.  He is a former patient of Dr. Armstrong.  His last sleep study was obtained on 12/15/2009 revealing an AHI of 14.02/H.  Patient is not presently on PAP therapy.  Per his questionnaire patient reports snoring, and witnessed apnea.  He typically goes to bed at 10:45 PM waking at 6:25 AM on weekdays, 11 PM waking at 6:45 AM on weekends.  He does take naps for 1 hour.  He denies use of tobacco, drinks alcohol 2-3 times per week, and denies use of illicit drugs.  Patient drinks 4 cups of regular/decaf coffee daily, and 2 decaf sean daily.  Been told by witnesses that he has had episodes of apnea, occasional pauses, and heavy snoring.     Further details are as follows:     [place] Scale is (out of 24): Total score: 7     He does report need for an occasional nap and will fall asleep watching TV at times.  He has a history of obstructive sleep apnea and was last evaluated approximately 2009.  His physician, Dr. Armstrong, has since retired.  He no longer uses the device.  Previously he had lost a significant amount of weight and was able to discontinue use.  He has since regained some of this back.  (End copied text)    -A home sleep test was obtained on 4/19/2023 revealing severe obstructive sleep apnea with an AHI of 70.6/H.    Interval History:  Pelon Johnson Jr. is a 62 y.o. male who presents for follow-up after home sleep test.  Patient was found with severe obstructive sleep apnea with an AHI of 70.6/h.  Recommendation is for trial of PAP therapy.    DME- [facility]    Further details are as follows:    [place] Scale is: 10/24      Weight:    Weight change in the last year:  gain: 0 lbs    The patient's relevant past medical,  "surgical, family, and social history reviewed and updated in Epic as appropriate.    PMH:    Past Medical History:   Diagnosis Date   • Arthritis    • CTS (carpal tunnel syndrome) 7-    Surgery on right wrist   • Fracture, femur 1-7--1973   • Fracture, fibula 1-7-1973   • Fracture, finger 9-2-1977   • Hypertension    • Knee swelling 4-   • Low back pain    • Low back strain 5-     Past Surgical History:   Procedure Laterality Date   • CARPAL TUNNEL RELEASE  2016    [facility] - Dr. Potts   • HERNIA REPAIR  2019       No Known Allergies    MEDS:  Prior to Admission medications    Medication Sig Start Date End Date Taking? Authorizing Provider   albuterol sulfate  (90 Base) MCG/ACT inhaler INHALE 1 TO 2 PUFFS BY MOUTH EVERY 4 TO 6 HOURS AS NEEDED FOR COUGH 11/4/22   Erica Romero MD   aspirin 81 MG EC tablet Take 81 mg by mouth Daily.    Erica Romero MD   atorvastatin (LIPITOR) 20 MG tablet Take 20 mg by mouth every night at bedtime. 1/5/23   Erica Romero MD   benzonatate (TESSALON) 200 MG capsule  10/29/22   Erica Romero MD   Diclofenac Sodium (VOLTAREN) 1 % gel gel Apply 4 g topically to the appropriate area as directed 4 (Four) Times a Day As Needed.    Erica Romero MD   gabapentin (NEURONTIN) 300 MG capsule TAKE 1 CAPSULE BY MOUTH THREE TIMES DAILY 8/15/22   Ezequiel Plunkett PA-C   meloxicam (MOBIC) 15 MG tablet Take 15 mg by mouth Daily.    Erica Romero MD   verapamil SR (CALAN-SR) 240 MG CR tablet  3/28/22   Erica Romero MD         FH:  Family History   Problem Relation Age of Onset   • Asthma Father    • Kidney disease Father        Objective   Vital Signs:  /76 (BP Location: Left arm, Patient Position: Sitting)   Pulse 79   Ht 177.8 cm (70\")   Wt (!) 140 kg (309 lb)   SpO2 96%   BMI 44.34 kg/m²     Class 3 Severe Obesity (BMI >=40). Obesity-related health conditions include the following: obstructive sleep " apnea. Obesity is newly identified. BMI is is above average; BMI management plan is completed. We discussed portion control and increasing exercise.        Physical Exam  Vitals reviewed.   Constitutional:       Appearance: Normal appearance.   HENT:      Head: Normocephalic and atraumatic.      Nose: Nose normal.      Mouth/Throat:      Mouth: Mucous membranes are moist.   Cardiovascular:      Rate and Rhythm: Normal rate and regular rhythm.      Heart sounds: No murmur heard.    No friction rub. No gallop.   Pulmonary:      Effort: Pulmonary effort is normal. No respiratory distress.      Breath sounds: Normal breath sounds. No wheezing or rhonchi.   Neurological:      Mental Status: He is alert and oriented to person, place, and time.   Psychiatric:         Behavior: Behavior normal.             Result Review :              Assessment and Plan  Pelon Johnson Jr. is a 62 y.o. male who returns for follow-up after sleep study.  Patient was found with severe obstructive sleep apnea with an AHI of 70.6/H.  Patient has a history of sleep apnea and was formally on PAP therapy.  He wishes to restart PAP therapy.  I will place orders for new device and supplies and have asked the patient to return for follow-up in compliance in 31-90 days.  I have noted that the patient will need to use the device more than 4 hours a night more than 70% of the time to remain in full compliance.  We discussed weight loss and the patient is can continuing to work on his weight.    Diagnoses and all orders for this visit:    1. Obstructive sleep apnea, adult (Primary)  -     PAP Therapy    2. Excessive daytime sleepiness    3. Class 3 severe obesity with body mass index (BMI) of 40.0 to 44.9 in adult, unspecified obesity type, unspecified whether serious comorbidity present              Follow Up  Return for 31 to 90 days after PAP setup.  Patient was given instructions and counseling regarding his condition or for health maintenance  advice. Please see specific information pulled into the AVS if appropriate.       BRENDON Larios, BannerP-BC  Pulmonology, Critical Care, and Sleep Medicine

## 2023-05-15 ENCOUNTER — OFFICE VISIT (OUTPATIENT)
Dept: SLEEP MEDICINE | Facility: CLINIC | Age: 63
End: 2023-05-15
Payer: COMMERCIAL

## 2023-05-15 VITALS
HEIGHT: 70 IN | DIASTOLIC BLOOD PRESSURE: 76 MMHG | WEIGHT: 309 LBS | SYSTOLIC BLOOD PRESSURE: 134 MMHG | BODY MASS INDEX: 44.24 KG/M2 | OXYGEN SATURATION: 96 % | HEART RATE: 79 BPM

## 2023-05-15 DIAGNOSIS — G47.33 OBSTRUCTIVE SLEEP APNEA, ADULT: Primary | ICD-10-CM

## 2023-05-15 DIAGNOSIS — G47.19 EXCESSIVE DAYTIME SLEEPINESS: ICD-10-CM

## 2023-05-15 DIAGNOSIS — E66.01 CLASS 3 SEVERE OBESITY WITH BODY MASS INDEX (BMI) OF 40.0 TO 44.9 IN ADULT, UNSPECIFIED OBESITY TYPE, UNSPECIFIED WHETHER SERIOUS COMORBIDITY PRESENT: ICD-10-CM

## 2023-05-15 PROCEDURE — 99213 OFFICE O/P EST LOW 20 MIN: CPT | Performed by: NURSE PRACTITIONER

## 2023-10-30 ENCOUNTER — OFFICE VISIT (OUTPATIENT)
Dept: ORTHOPEDIC SURGERY | Facility: CLINIC | Age: 63
End: 2023-10-30
Payer: COMMERCIAL

## 2023-10-30 VITALS — WEIGHT: 295 LBS | HEIGHT: 70 IN | BODY MASS INDEX: 42.23 KG/M2

## 2023-10-30 DIAGNOSIS — M17.12 PRIMARY OSTEOARTHRITIS OF LEFT KNEE: Primary | ICD-10-CM

## 2023-10-30 DIAGNOSIS — M25.562 LEFT KNEE PAIN, UNSPECIFIED CHRONICITY: ICD-10-CM

## 2023-10-30 DIAGNOSIS — E66.01 CLASS 3 SEVERE OBESITY DUE TO EXCESS CALORIES WITHOUT SERIOUS COMORBIDITY WITH BODY MASS INDEX (BMI) OF 40.0 TO 44.9 IN ADULT: ICD-10-CM

## 2023-10-30 PROCEDURE — 99214 OFFICE O/P EST MOD 30 MIN: CPT | Performed by: PHYSICIAN ASSISTANT

## 2023-10-30 NOTE — PROGRESS NOTES
"    [facility] [place] [facility] Note        Subjective     CC: Follow-up (4 mo f/u - Primary osteoarthritis of left knee)      HPI    Pelon Johnson Jr. is a 63 y.o. male.  Patient returns today for follow-up left knee pain.  Previously seen in June 2023 and provided a corticosteroid injection at that appointment.  Patient is working on weight loss.  States he typically takes 200 ibuprofen at night and in the morning uses Voltaren gel or blue emu.  Does note occasional limp.  Notes some decrease in stability to the knee due to the giving away sensation.    Pain scale: 2/10.  Associated symptoms popping, giving way.  Pain is worse with walking, standing, stairs, rising from seated position.  Resting, medication and elevating help.  Has been participating in PT.    Overall, patient's symptoms are worse.    ROS:    Constiutional:Pt denies fever, chills, nausea, or vomiting.  MSK:as above        Objective      Past Medical History  Past Medical History:   Diagnosis Date    Arthritis     CTS (carpal tunnel syndrome) 7-    Surgery on right wrist    Fracture, femur 1-7--1973    Fracture, fibula 1-7-1973    Fracture, finger 9-2-1977    Hypertension     Knee swelling 4-    Low back pain     Low back strain 5-     Social History     Socioeconomic History    Marital status: Single   Tobacco Use    Smoking status: Never    Smokeless tobacco: Never   Vaping Use    Vaping Use: Never used   Substance and Sexual Activity    Alcohol use: Yes     Alcohol/week: 4.0 standard drinks of alcohol     Types: 1 Glasses of wine, 2 Cans of beer, 1 Drinks containing 0.5 oz of alcohol per week     Comment: 1-3 per week    Drug use: Never    Sexual activity: Yes     Partners: Female     Birth control/protection: Condom          Physical Exam  Ht 176.6 cm (69.53\")   Wt 134 kg (295 lb)   BMI 42.90 kg/m²     Body mass index is 42.9 kg/m².    Patient is well nourished and well developed.        Ortho Exam  Left " knee  Skin: Grossly intact without any redness or warmth.  Trace effusion noted.  Tenderness: Positive along medial joint line.  Motion: 5-120 degrees with crepitus.  Straight leg raise: Intact.  Testing: Varus and valgus stress test negative.  Lachman negative.  Motor/sensory: Grossly intact L2-S1.      Imaging/Labs/EMG Reviewed:  Patient had another appointment and is unable to wait for updated films.  He understands that his follow-up appointment he will need new imaging of the left knee.      Assessment:  1. Primary osteoarthritis of left knee    2. Left knee pain, unspecified chronicity    3. Class 3 severe obesity due to excess calories without serious comorbidity with body mass index (BMI) of 40.0 to 44.9 in adult        Plan:  Osteoarthritis left knee causing ongoing pain to the knee, worsening.  Discussed treatment options with the patient.  Patient would like to try viscosupplementation series.  Preauthorization was placed.    He was unable to get updated films today due to needing to proceed onto another appointment.  He understands he will need updated films to assess progression of the arthritis at his next appointment.  Patient is a candidate for TKA but has been working on weight loss to get his BMI under 40 so he can proceed with TKA.  Obesity--BMI now 42.9 improved from 45.5.  Patient has been actively working on weight loss through dieting, portion control, calorie restriction and non to low impact exercise for weight loss.  Additionally discussed use of medial off  brace as an additional treatment option.  Patient is interested in this.  It would be easier for him to go over to the [facility] in order to obtain the brace.  Will contact him if this is possible.  Recommend OTC pain medications as needed.  Follow-up when injections are approved to be started.  If they do not get approved then patient would like to return for corticosteroid injection, again understanding he will need plain  film imaging first.    Questions and concerns answered.      Nat Thompson PA-C  10/30/23  22:13 EDT      Dictated Utilizing Dragon Dictation.

## 2023-11-02 ENCOUNTER — TELEPHONE (OUTPATIENT)
Age: 63
End: 2023-11-02
Payer: COMMERCIAL

## 2023-11-02 NOTE — TELEPHONE ENCOUNTER
Called pt per Nat to let him know when he could stop by [facility] office for a medial off- brace; Pt plans to come in tomorrow, 11/3/23 at about 3:15.      Dillon GAUTHIER CMA ([facility]), ROT

## 2024-01-02 ENCOUNTER — TELEPHONE (OUTPATIENT)
Dept: ORTHOPEDIC SURGERY | Facility: CLINIC | Age: 64
End: 2024-01-02

## 2024-01-02 NOTE — TELEPHONE ENCOUNTER
Caller: Pelon Johnson Jr.    Relationship to patient: Self    Best call back number: [phone] (home)     Patient is needing: PATIENT STATED THAT HE MISSED A CALL FROM OFFICE, BUT VM DIDN'T SAY WHAT IT WAS IN REGARDS TO.   PLEASE ADVISE

## 2024-02-06 ENCOUNTER — OFFICE VISIT (OUTPATIENT)
Dept: ORTHOPEDIC SURGERY | Facility: CLINIC | Age: 64
End: 2024-02-06
Payer: COMMERCIAL

## 2024-02-06 VITALS
WEIGHT: 296.8 LBS | DIASTOLIC BLOOD PRESSURE: 84 MMHG | HEIGHT: 69 IN | SYSTOLIC BLOOD PRESSURE: 136 MMHG | BODY MASS INDEX: 43.96 KG/M2

## 2024-02-06 DIAGNOSIS — M17.12 PRIMARY OSTEOARTHRITIS OF LEFT KNEE: Primary | ICD-10-CM

## 2024-02-06 DIAGNOSIS — E66.01 CLASS 3 SEVERE OBESITY DUE TO EXCESS CALORIES WITHOUT SERIOUS COMORBIDITY WITH BODY MASS INDEX (BMI) OF 40.0 TO 44.9 IN ADULT: ICD-10-CM

## 2024-02-06 PROCEDURE — 99213 OFFICE O/P EST LOW 20 MIN: CPT | Performed by: PHYSICIAN ASSISTANT

## 2024-02-06 RX ORDER — MELOXICAM 15 MG/1
1 TABLET ORAL DAILY
COMMUNITY
Start: 2024-01-16

## 2024-02-06 NOTE — PROGRESS NOTES
"Orthopaedic Clinic Note: Knee Established Patient    Chief Complaint   Patient presents with   • Follow-up     3 month follow up - Primary osteoarthritis of left knee        HPI    It has been {Numbers; 0-30:43868}  {DAYS, WEEKS, MONTHS, YEARS:03110} since Mr. Johnson's last visit. He returns to clinic today for ***. He rates his pain a {0-10:46281}/10 on the pain scale and is currently taking {Meds Pt is Takin} for pain. He is ambulating with {Ambulating Devices:85655}. He {completed/continuin} {therapy/home exercise program:40746}.  He {denies/admits drainage:65571}.  Overall, he is doing {better worse same:84727}. ***    I have reviewed the following portions of the patient's history:{Stella HPI:88632::\"History of Present Illness\"}    Past Medical History:   Diagnosis Date   • Arthritis    • CTS (carpal tunnel syndrome) 2014    Surgery on right wrist   • Fracture, femur    • Fracture, fibula 1973   • Fracture, finger 1977   • Hypertension    • Knee swelling 2022   • Low back pain    • Low back strain 1996      Past Surgical History:   Procedure Laterality Date   • CARPAL TUNNEL RELEASE      [facility] - Dr. Potts   • HERNIA REPAIR  2019      Family History   Problem Relation Age of Onset   • Asthma Father    • Kidney disease Father      Social History     Socioeconomic History   • Marital status: Single   Tobacco Use   • Smoking status: Never   • Smokeless tobacco: Never   Vaping Use   • Vaping Use: Never used   Substance and Sexual Activity   • Alcohol use: Yes     Alcohol/week: 4.0 standard drinks of alcohol     Types: 1 Glasses of wine, 2 Cans of beer, 1 Drinks containing 0.5 oz of alcohol per week     Comment: 1-3 per week   • Drug use: Never   • Sexual activity: Yes     Partners: Female     Birth control/protection: Condom      Current Outpatient Medications on File Prior to Visit   Medication Sig Dispense Refill   • aspirin 81 MG EC tablet Take 1 " "tablet by mouth Daily.     • atorvastatin (LIPITOR) 20 MG tablet Take 1 tablet by mouth every night at bedtime.     • Diclofenac Sodium (VOLTAREN) 1 % gel gel Apply 4 g topically to the appropriate area as directed 4 (Four) Times a Day As Needed.     • meloxicam (MOBIC) 15 MG tablet Take 1 tablet by mouth Daily.     • verapamil SR (CALAN-SR) 240 MG CR tablet      • [DISCONTINUED] benzonatate (TESSALON) 200 MG capsule  (Patient not taking: Reported on 2/6/2024)       No current facility-administered medications on file prior to visit.      No Known Allergies     Review of Systems   Constitutional: Negative.    HENT: Negative.     Eyes: Negative.    Respiratory: Negative.     Cardiovascular: Negative.    Gastrointestinal: Negative.    Endocrine: Negative.    Genitourinary: Negative.    Musculoskeletal:  Positive for arthralgias.   Skin: Negative.    Allergic/Immunologic: Negative.    Neurological: Negative.    Hematological: Negative.    Psychiatric/Behavioral: Negative.        The patient's Review of Systems was personally reviewed and confirmed as accurate.    Physical Exam  Height 175.8 cm (69.21\"), weight 135 kg (296 lb 12.8 oz).    Body mass index is 43.56 kg/m².    GENERAL APPEARANCE: {General Appearance:82979::\"awake, alert, oriented, in no acute distress\",\"well developed, well nourished\"}  LUNGS:  breathing nonlabored  EXTREMITIES: no clubbing, cyanosis  PERIPHERAL PULSES: palpable dorsalis pedis and posterior tibial pulses bilaterally.    GAIT:  {Gait:24047}        ----------  {RIGHT LEFT BILATERAL:39778} Knee Exam:  ----------  ALIGNMENT: {Neutral/varus:58979}  ----------  RANGE OF MOTION:  {Stella Knee ROM:97250}  LIGAMENTOUS STABILITY:   {Stella Stable:22385}  ----------  STRENGTH:  KNEE FLEXION {0-5:60653::\"5\"}/5  KNEE EXTENSION  {0-5:27370::\"5\"}/5  ANKLE DORSIFLEXION  {0-5:21060::\"5\"}/5  ANKLE PLANTARFLEXION  {0-5:05480::\"5\"}/5  ----------  PAIN WITH PALPATION:{Knee pain location:14925}  KNEE EFFUSION: " "{Yes/No:20195}  PAIN WITH KNEE ROM: {YES NO:79199}  PATELLAR CREPITUS:  {knee crepitus:21198}  ----------  SENSATION TO LIGHT TOUCH:  DEEP PERONEAL/SUPERFICIAL PERONEAL/SURAL/SAPHENOUS/TIBIAL:    {Sensation:72682::\"intact\"}  ----------  EDEMA:  {Yes/No:11978}  ERYTHEMA:    {Yes/No:42626}  WOUNDS/INCISIONS:   {Yes/No:13770}  _____________________________________________________________________  _____________________________________________________________________    RADIOGRAPHIC FINDINGS:   Indication: ***    Comparison: {Stella XR comparison:98666}    Knee films: {Stella knee XR findings:95708}    Assessment/Plan:  No diagnosis found.  ***      Radha Mar  02/06/24  15:27 EST  "

## 2024-02-06 NOTE — PROGRESS NOTES
"    [facility] [place] [facility] [place]        Subjective     CC: Follow-up (3 month follow up - Primary osteoarthritis of left knee)      HPI    Pelon Johnson Jr. is a 63 y.o. male.  Patient returns for follow-up evaluation of his left knee.  Provided a corticosteroid injection back in June 2023.  He states that injection is still working quite well for him.  He is trying to work on weight loss so that he can be a candidate for TKA.  At last appointment he was provided with a brace.  He is currently using Voltaren gel and meloxicam as needed.  At last appointment, had placed preauthorization for viscosupplementation series; unfortunately, his insurance would not cover them and they were denied.    Pain scale: 2/10.  Associated symptoms pain, swelling, popping, grinding, stiffness.  Walking, standing, stairs increased the pain.  Resting, sitting, medication, lying down, elevating help.    Overall, patient's symptoms are improved.    ROS:    Constiutional:Pt denies fever, chills, nausea, or vomiting.  MSK:as above        Objective      Past Medical History  Past Medical History:   Diagnosis Date    Arthritis     CTS (carpal tunnel syndrome) 7-    Surgery on right wrist    Fracture, femur 1-7--1973    Fracture, fibula 1-7-1973    Fracture, finger 9-2-1977    Hypertension     Knee swelling 4-    Low back pain     Low back strain 5-     Social History     Socioeconomic History    Marital status: Single   Tobacco Use    Smoking status: Never    Smokeless tobacco: Never   Vaping Use    Vaping Use: Never used   Substance and Sexual Activity    Alcohol use: Yes     Alcohol/week: 4.0 standard drinks of alcohol     Types: 1 Glasses of wine, 2 Cans of beer, 1 Drinks containing 0.5 oz of alcohol per week     Comment: 1-3 per week    Drug use: Never    Sexual activity: Yes     Partners: Female     Birth control/protection: Condom          Physical Exam  /84   Ht 175.8 cm (69.21\")   Wt 135 kg (296 " lb 12.8 oz)   BMI 43.56 kg/m²     Body mass index is 43.56 kg/m².    Patient is well nourished and well developed.        Ortho Exam  Left knee  Skin: Grossly intact without any redness or warmth.  Trace effusion noted.  Tenderness: Positive medial joint line, nhi-/retropatellar.  Motion: 5-120 degrees with crepitus.  Straight leg raise: Intact.  Testing: Varus and valgus stress test negative.  Lachman negative.  Motor/sensory: Grossly intact L2-S1.      Imaging/Labs/EMG Reviewed:  Ordered left knee plain films.  Imaging read/interpreted by Dr. Levy.    Left knee X-Ray     Indication: Pain     Study:  Upright AP, Skiers, Lateral, and Sunrise views of Left knee(s)     Comparison: Left knee 8/12/2022     Findings:     Patient appears to have severe degenerative changes in the medial compartment.    There are mild degenerative changes in the lateral compartment.    There are severe changes in the patellofemoral compartment.    Patient has overall varus alignment.    Kellgren-Cedrick thGthrthathdtheth:th th5th or        Impression:   Severe medial compartment and patellofemoral compartment degnerative changes of the left knee       Assessment:  1. Primary osteoarthritis of left knee    2. Class 3 severe obesity due to excess calories without serious comorbidity with body mass index (BMI) of 40.0 to 44.9 in adult        Plan:  Osteoarthritis left knee--patient reports pain is currently tolerable.  He is uninterested in proceeding with any type of injections.  Reviewed imaging with the patient.  He is a candidate for TKA but he is working on weight loss to get his BMI under 40.  Current BMI 43.56.  Goal weight is 270 pounds.  Patient recently started on Golo to help assist with weight loss.  In the meantime we will continue with dieting, calorie restriction, portion control, non to low impact exercise.  He will continue use of Voltaren gel and meloxicam as needed.  Follow up in 3 months--weight check to determine how close he to  his weight loss goal in order to proceed with TKA.  Questions and concerns answered.      Nat Thompson PA-C  02/09/24  11:07 EST      Dictated Utilizing Dragon Dictation.

## 2024-07-05 NOTE — PROGRESS NOTES
Sleep Clinic Follow Up Note    Chief Complaint  Sleep Apnea (Yearly follow up )    Subjective     History of Present Illness (from previous encounter on 7/10/2023):  Pelon Johnson Jr. is a 62 y.o. male who returns for follow-up compliance of PAP therapy.  Pap report has been reviewed.  Overall usage is at 76% with greater than 4-hour compliance at 73%.  Patient averages 6 hours 47 minutes on nights used.  AHI is 5.2/H.  He is much improved and wishes to continue PAP therapy.  I will refill the patient's supplies, and have asked that he return for follow-up and compliance in 1 year or sooner should he have further questions or concerns.  The patient has been working on weight loss.  She has some difficulty with exercise related to need for knee injections, and physical therapy. (End copied text).    Interval History:  Pelon Johnson Jr. is a 63 y.o. male returns for follow up and compliance of PAP therapy. The patient was last seen on 7/10/2023 by me. Overall the patient feels good with regard to therapy. The device appears to be working appropriately. On average the patient sleeps 7 hours per night, on weekdays and 8 hours on the weekends. The patient wakes 1-2 times per night.     The patient reports the following changes to their medical and medication history since they were last seen:  Zepbound        Further details are as follows:      [place] Scale is (out of 24):       Weight:  Current Weight: 303 lb    Weight change in the last year:  gain: 0lbs    The patient's relevant past medical, surgical, family, and social history reviewed and updated in Epic as appropriate.    PMH:    Past Medical History:   Diagnosis Date    Arthritis     CTS (carpal tunnel syndrome) 7-    Surgery on right wrist    Fracture, femur 1-7--1973    Fracture, fibula 1-7-1973    Fracture, finger 9-2-1977    Hypertension     Knee swelling 4-    Low back pain     Low back strain 5-     Past Surgical History:  "  Procedure Laterality Date    CARPAL TUNNEL RELEASE  2016    [facility] - Dr. Potts    HERNIA REPAIR  2019       No Known Allergies    MEDS:  Prior to Admission medications    Medication Sig Start Date End Date Taking? Authorizing Provider   aspirin 81 MG EC tablet Take 1 tablet by mouth Daily.    Erica Romero MD   atorvastatin (LIPITOR) 20 MG tablet Take 1 tablet by mouth every night at bedtime. 1/5/23   Erica Romero MD   Diclofenac Sodium (VOLTAREN) 1 % gel gel Apply 4 g topically to the appropriate area as directed 4 (Four) Times a Day As Needed.    Erica Romero MD   meloxicam (MOBIC) 15 MG tablet Take 1 tablet by mouth Daily. 1/16/24   Erica Romero MD   verapamil SR (CALAN-SR) 240 MG CR tablet  3/28/22   Erica Romero MD         FH:  Family History   Problem Relation Age of Onset    Asthma Father     Kidney disease Father        Objective   Vital Signs:  /86 (BP Location: Left arm, Patient Position: Sitting, Cuff Size: Adult)   Pulse 74   Resp 18   Ht 175.8 cm (69.21\")   Wt (!) 137 kg (303 lb)   SpO2 98%   BMI 44.47 kg/m²     Class 3 Severe Obesity (BMI >=40). Obesity-related health conditions include the following: obstructive sleep apnea. Obesity is unchanged. BMI is is above average; BMI management plan is completed. We discussed portion control.        Physical Exam  Vitals reviewed.   Constitutional:       Appearance: Normal appearance.   HENT:      Head: Normocephalic and atraumatic.      Nose: Nose normal.      Mouth/Throat:      Mouth: Mucous membranes are moist.   Cardiovascular:      Rate and Rhythm: Normal rate and regular rhythm.      Heart sounds: No murmur heard.     No friction rub. No gallop.   Pulmonary:      Effort: Pulmonary effort is normal. No respiratory distress.      Breath sounds: Normal breath sounds. No wheezing or rhonchi.   Neurological:      Mental Status: He is alert and oriented to person, place, and time. "   Psychiatric:         Behavior: Behavior normal.               Result Review :           PAP Report:  AHI: 4.4/h  Days of Usage: 90/90 (100%)  Number of Days Greater than 4 hours: 90/90 (100%)  Average time (days used): 6 hours 54 minutes  95th Percentile Pressure: 13.1 cmH2O  95th percentile leaks: 78.2 L/min  Settings: Auto CPAP-8/18 cm H2O, EPR full-time, EPR level 1, response standard.       Assessment and Plan  Pelon Johnson Jr. is a 63 y.o. male who returns for follow-up and compliance of PAP therapy.  The Pap report has been reviewed.  Overall usage and compliance are excellent at 100%.  Patient averages 6 hours and 54 minutes of therapy.  Sleep apnea is well-controlled with an AHI of 4.4/H.  I note a large leak at 78.2 L/min.  This may improve with mask fitting/change by DME company. I will refill the patient's supplies, and I have asked them to return for follow-up and compliance in 1 year or sooner should they have further questions or concerns.     Diagnoses and all orders for this visit:    1. Obstructive sleep apnea, adult (Primary)  -     PAP Therapy    2. Morbid (severe) obesity due to excess calories           The patient continues to use and benefit from PAP therapy.    1. The patient was counseled regarding multimodal approach with healthy nutrition, healthy sleep, regular physical activity, social activities, counseling, and medications. Encouraged to practice lateral sleep position. Avoid alcohol and sedatives close to bedtime.     2.  We will refill supplies x1 year.  Return to clinic 1 year or sooner if symptoms warrant. I have reviewed the results of my evaluation and impression and discussed my recommendations in detail with the patient.           Follow Up  Return in about 1 year (around 7/8/2025).  Patient was given instructions and counseling regarding his condition or for health maintenance advice. Please see specific information pulled into the AVS if appropriate.       Jt YEN  BRENDON Sandhu, ACNP-BC  Pulmonology, Critical Care, and Sleep Medicine

## 2024-07-08 ENCOUNTER — OFFICE VISIT (OUTPATIENT)
Dept: SLEEP MEDICINE | Age: 64
End: 2024-07-08
Payer: MEDICARE

## 2024-07-08 VITALS
BODY MASS INDEX: 44.88 KG/M2 | DIASTOLIC BLOOD PRESSURE: 86 MMHG | OXYGEN SATURATION: 98 % | SYSTOLIC BLOOD PRESSURE: 144 MMHG | RESPIRATION RATE: 18 BRPM | HEIGHT: 69 IN | WEIGHT: 303 LBS | HEART RATE: 74 BPM

## 2024-07-08 DIAGNOSIS — G47.33 OBSTRUCTIVE SLEEP APNEA, ADULT: Primary | ICD-10-CM

## 2024-07-08 DIAGNOSIS — E66.01 MORBID (SEVERE) OBESITY DUE TO EXCESS CALORIES: ICD-10-CM

## 2024-07-08 PROCEDURE — 99213 OFFICE O/P EST LOW 20 MIN: CPT | Performed by: NURSE PRACTITIONER

## 2024-07-08 PROCEDURE — 1160F RVW MEDS BY RX/DR IN RCRD: CPT | Performed by: NURSE PRACTITIONER

## 2024-07-08 PROCEDURE — 1159F MED LIST DOCD IN RCRD: CPT | Performed by: NURSE PRACTITIONER

## 2024-07-08 RX ORDER — TIRZEPATIDE 2.5 MG/.5ML
2.5 INJECTION, SOLUTION SUBCUTANEOUS WEEKLY
COMMUNITY
Start: 2024-04-24

## 2024-08-23 ENCOUNTER — OFFICE VISIT (OUTPATIENT)
Age: 64
End: 2024-08-23
Payer: MEDICARE

## 2024-08-23 VITALS
HEIGHT: 69 IN | DIASTOLIC BLOOD PRESSURE: 86 MMHG | WEIGHT: 286.7 LBS | SYSTOLIC BLOOD PRESSURE: 136 MMHG | BODY MASS INDEX: 42.46 KG/M2

## 2024-08-23 DIAGNOSIS — M17.12 PRIMARY OSTEOARTHRITIS OF LEFT KNEE: Primary | ICD-10-CM

## 2024-08-23 DIAGNOSIS — E66.01 CLASS 3 SEVERE OBESITY DUE TO EXCESS CALORIES WITHOUT SERIOUS COMORBIDITY WITH BODY MASS INDEX (BMI) OF 40.0 TO 44.9 IN ADULT: ICD-10-CM

## 2024-08-23 PROCEDURE — 1160F RVW MEDS BY RX/DR IN RCRD: CPT | Performed by: PHYSICIAN ASSISTANT

## 2024-08-23 PROCEDURE — 99213 OFFICE O/P EST LOW 20 MIN: CPT | Performed by: PHYSICIAN ASSISTANT

## 2024-08-23 PROCEDURE — 1159F MED LIST DOCD IN RCRD: CPT | Performed by: PHYSICIAN ASSISTANT

## 2024-08-23 RX ORDER — ERGOCALCIFEROL 1.25 MG/1
1 CAPSULE, LIQUID FILLED ORAL WEEKLY
COMMUNITY
Start: 2024-08-17

## 2024-08-23 NOTE — PROGRESS NOTES
"    [facility] [place] [facility] [place]        Subjective     CC: Follow-up (6 month follow-up--Primary osteoarthritis of left knee)      HPI    Pelon Johnson Jr. is a 64 y.o. male.  Patient returns today for follow-up evaluation of his left knee.  He has been working with a massage therapist (Jp Ogden) and a chiropractor (Marco Santamaria).  States the chiropractor has been doing shockwave type treatments on his knee, he has had 8-9 sessions and it is working quite well.  At this time he has no pain.    Pain scale: 0/10.  When he does have pain he notes pain swelling and popping.  Typically it is worse with walking and standing.  Resting, medication and elevating to help.  But as stated patient is doing much better and is not having any pain at this time.    Overall, patient's symptoms are improved.  He continues to work on weight loss.    ROS:    Constiutional:Pt denies fever, chills, nausea, or vomiting.  MSK:as above        Objective      Past Medical History  Past Medical History:   Diagnosis Date    Arthritis     CTS (carpal tunnel syndrome) 7-    Surgery on right wrist    Fracture, femur 1-7--1973    Fracture, fibula 1-7-1973    Fracture, finger 9-2-1977    Hypertension     Knee swelling 4-    Low back pain     Low back strain 5-     Social History     Socioeconomic History    Marital status: Single   Tobacco Use    Smoking status: Never     Passive exposure: Never    Smokeless tobacco: Never   Vaping Use    Vaping status: Never Used   Substance and Sexual Activity    Alcohol use: Yes     Alcohol/week: 4.0 standard drinks of alcohol     Types: 1 Glasses of wine, 2 Cans of beer, 1 Drinks containing 0.5 oz of alcohol per week    Drug use: Never    Sexual activity: Yes     Partners: Female     Birth control/protection: Condom          Physical Exam  /86   Ht 176 cm (69.29\")   Wt 130 kg (286 lb 11.2 oz)   BMI 41.98 kg/m²     Body mass index is 41.98 kg/m².    Patient is well " nourished and well developed.        Ortho Exam  Left knee  Skin: Grossly intact without any redness or warmth.  Trace effusion.  Tenderness: No tenderness on exam today.  Motion: 5-120 degrees with crepitus.  Straight leg raise: Intact.  Testing: Varus and valgus stress test negative.  Lachman negative.  Motor/sensory: Grossly intact L2-S1.      Imaging/Labs/EMG Reviewed and Interpreted:  No new imaging today.      Assessment:  1. Primary osteoarthritis of left knee    2. Class 3 severe obesity due to excess calories without serious comorbidity with body mass index (BMI) of 40.0 to 44.9 in adult        Plan:  Osteoarthritis left knee--no pain noted today.patient reports pain is currently tolerable.    Again he is a candidate for TKA but he is working on weight loss to get his BMI under 40.  Current BMI 41.98.  Goal weight is 265 pounds.  Will continue with dieting, calorie restriction, portion control, non to low impact exercise.  Currently patient using 1 extra strength Tylenol daily.  For now he will follow-up as needed.    Questions and concerns answered.      Nat Thompson PA-C  08/23/24  11:30 EDT      Dictated Utilizing Dragon Dictation.

## 2025-07-15 NOTE — PROGRESS NOTES
Sleep Clinic Follow Up Note    Chief Complaint  Sleeping Problem, Sleep Apnea, and Follow-up (Follow up)    Subjective     History of Present Illness (from previous encounter on 7/8/2024):  Pelon Johnson Jr. is a 63 y.o. male who returns for follow-up and compliance of PAP therapy.  The Pap report has been reviewed.  Overall usage and compliance are excellent at 100%.  Patient averages 6 hours and 54 minutes of therapy.  Sleep apnea is well-controlled with an AHI of 4.4/H.  I note a large leak at 78.2 L/min.  This may improve with mask fitting/change by [employer] company. I will refill the patient's supplies, and I have asked them to return for follow-up and compliance in 1 year or sooner should they have further questions or concerns. (End copied text).    Interval History:  Pelon Johnson Jr. is a 64 y.o. male returns for follow up and compliance of PAP therapy. The patient was last seen on 7/8/2024 by me. Overall the patient feels good with regard to therapy. The device appears to be working appropriately. On average the patient sleeps 7-7.5 hours per night. The patient wakes 1-2 times per night.     The patient reports the following changes to their medical and medication history since they were last seen:  Will have knee surgery in the fall    Further details are as follows:      [place] Scale is (out of 24): Total score: 6     Weight:  Current Weight: 283 lb      The patient's relevant past medical, surgical, family, and social history reviewed and updated in Epic as appropriate.    PMH:    Past Medical History:   Diagnosis Date    Arthritis     CTS (carpal tunnel syndrome) 7-    Surgery on right wrist    Fracture, femur 1-7--1973    Fracture, fibula 1-7-1973    Fracture, finger 9-2-1977    Hypertension     Knee swelling 4-    Low back pain     Low back strain 5-     Past Surgical History:   Procedure Laterality Date    CARPAL TUNNEL RELEASE  2016    [facility] - Dr. Potts    HERNIA  "REPAIR  2019       Allergies   Allergen Reactions    Erythromycin Nausea And Vomiting       MEDS:  Prior to Admission medications    Medication Sig Start Date End Date Taking? Authorizing Provider   aspirin 81 MG EC tablet Take 1 tablet by mouth Daily.    Erica Roemro MD   atorvastatin (LIPITOR) 20 MG tablet Take 1 tablet by mouth every night at bedtime. 1/5/23   Erica Romero MD   Diclofenac Sodium (VOLTAREN) 1 % gel gel Apply 4 g topically to the appropriate area as directed 4 (Four) Times a Day As Needed.    Erica Romero MD   meloxicam (MOBIC) 15 MG tablet Take 1 tablet by mouth Daily. 1/16/24   Erica Romero MD   verapamil SR (CALAN-SR) 240 MG CR tablet  3/28/22   Erica Romero MD   vitamin D (ERGOCALCIFEROL) 1.25 MG (67716 UT) capsule capsule Take 1 capsule by mouth 1 (One) Time Per Week. 8/17/24   Erica Romero MD   Zepbound 2.5 MG/0.5ML solution auto-injector Inject 0.5 mL under the skin into the appropriate area as directed 1 (One) Time Per Week. 4/24/24   Erica Romero MD         FH:  Family History   Problem Relation Age of Onset    Asthma Father     Kidney disease Father        Objective   Vital Signs:  /78 (BP Location: Left arm, Patient Position: Sitting, Cuff Size: Adult)   Pulse 78   Temp 96.6 °F (35.9 °C) (Temporal)   Ht 176 cm (69.29\")   Wt 129 kg (283 lb 4.8 oz)   SpO2 99%   BMI 41.49 kg/m²     Patient's (Body mass index is 41.49 kg/m².) indicates that they are morbidly obese (BMI > 40 or > 35 with obesity - related health condition)         Physical Exam  Vitals reviewed.   Constitutional:       Appearance: Normal appearance.   HENT:      Head: Normocephalic and atraumatic.      Nose: Nose normal.      Mouth/Throat:      Mouth: Mucous membranes are moist.   Cardiovascular:      Rate and Rhythm: Normal rate and regular rhythm.      Heart sounds: No murmur heard.     No friction rub. No gallop.   Pulmonary:      Effort: Pulmonary " effort is normal. No respiratory distress.      Breath sounds: Normal breath sounds. No wheezing or rhonchi.   Neurological:      Mental Status: He is alert and oriented to person, place, and time.   Psychiatric:         Behavior: Behavior normal.               Result Review :           PAP Report:  AHI: 8.9/h  Days of Usage: 30/30 (100%)  Number of Days Greater than 4 hours: 60%  Average time (days used): 4 hours 32 minutes  95th Percentile Pressure: 15.3 cmH2O  95th percentile leaks: 38.9 L/min  Settings: Auto CPAP-8/18 cm H2O, EPR full-time, EPR level 1, response standard.       Assessment and Plan  Pelon Johnson Jr. is a 64 y.o. male who returns for follow-up and compliance of PAP therapy.  The Pap report has been reviewed.  Overall usage is at 100% with compliance at 60%.  The patient averaged 4 hours and 32 minutes of therapy.  Sleep apnea is improved with an AHI of 8.9/H.  Patient has a history of severe obstructive sleep send initial AHI of 70.6/H.  I note a moderate to severe leak at 38.9 L/min.  This may improve with mask fitting/change by [employer]. I will refill the patient's supplies, and I have asked them to return for follow-up and compliance in 1 year or sooner should they have further questions or concerns.    Diagnoses and all orders for this visit:    1. Obstructive sleep apnea, adult (Primary)  -     PAP Therapy    2. Morbid (severe) obesity due to excess calories         The patient continues to use and benefit from PAP therapy.    1. The patient was counseled regarding multimodal approach with healthy nutrition, healthy sleep, regular physical activity, social activities, counseling, and medications. Encouraged to practice lateral sleep position. Avoid alcohol and sedatives close to bedtime.     2.  We will refill supplies x1 year.  Return to clinic 1 year or sooner if symptoms warrant. I have reviewed the results of my evaluation and impression and discussed my recommendations in detail  with the patient.           Follow Up  Return in about 1 year (around 7/17/2026) for Annual visit.  Patient was given instructions and counseling regarding his condition or for health maintenance advice. Please see specific information pulled into the AVS if appropriate.       BRENDON Larios, ACNP-BC  Pulmonology, Critical Care, and Sleep Medicine

## 2025-07-17 ENCOUNTER — OFFICE VISIT (OUTPATIENT)
Dept: SLEEP MEDICINE | Age: 65
End: 2025-07-17
Payer: MEDICARE

## 2025-07-17 VITALS
OXYGEN SATURATION: 99 % | HEIGHT: 69 IN | BODY MASS INDEX: 41.96 KG/M2 | WEIGHT: 283.3 LBS | TEMPERATURE: 96.6 F | DIASTOLIC BLOOD PRESSURE: 78 MMHG | HEART RATE: 78 BPM | SYSTOLIC BLOOD PRESSURE: 150 MMHG

## 2025-07-17 DIAGNOSIS — G47.33 OBSTRUCTIVE SLEEP APNEA, ADULT: Primary | ICD-10-CM

## 2025-07-17 DIAGNOSIS — E66.01 MORBID (SEVERE) OBESITY DUE TO EXCESS CALORIES: ICD-10-CM
